# Patient Record
Sex: FEMALE | Race: WHITE | NOT HISPANIC OR LATINO | Employment: OTHER | URBAN - METROPOLITAN AREA
[De-identification: names, ages, dates, MRNs, and addresses within clinical notes are randomized per-mention and may not be internally consistent; named-entity substitution may affect disease eponyms.]

---

## 2018-02-28 LAB
BACTERIA UR CULT: NO GROWTH
EXTERNAL ANTIBODY SCREEN: NORMAL
EXTERNAL RUBELLA IGG QUANTITATION: NORMAL
HBV SURFACE AG SER QL: NEGATIVE
HCT VFR BLD AUTO: 40.1 % (ref 35–45)
HCV AB SER QL: NEGATIVE
HGB BLD-MCNC: 13.1 G/DL (ref 11.8–15.7)
HIV 1+2 AB+HIV1 P24 AG SERPL QL IA: NONREACTIVE
PLATELET # BLD AUTO: 291 K/UL (ref 150–369)
RPR SER QL: NEGATIVE

## 2018-04-25 ENCOUNTER — OFFICE VISIT (OUTPATIENT)
Dept: OBSTETRICS AND GYNECOLOGY | Facility: CLINIC | Age: 43
End: 2018-04-25
Payer: COMMERCIAL

## 2018-04-25 VITALS — WEIGHT: 126 LBS | SYSTOLIC BLOOD PRESSURE: 116 MMHG | DIASTOLIC BLOOD PRESSURE: 80 MMHG

## 2018-04-25 DIAGNOSIS — O09.521 ELDERLY MULTIGRAVIDA IN FIRST TRIMESTER: Primary | ICD-10-CM

## 2018-04-25 DIAGNOSIS — Z34.90 PREGNANCY, UNSPECIFIED GESTATIONAL AGE: ICD-10-CM

## 2018-04-25 LAB
BILIRUBIN, POC: NEGATIVE
BLOOD URINE, POC: NEGATIVE
CLARITY, POC: CLEAR
COLOR, POC: YELLOW
GLUCOSE URINE, POC: NEGATIVE
KETONES, POC: NEGATIVE
LEUKOCYTE EST, POC: NEGATIVE
NITRITE, POC: NEGATIVE
PROTEIN, POC: NEGATIVE

## 2018-04-25 PROCEDURE — 81002 URINALYSIS NONAUTO W/O SCOPE: CPT | Performed by: OBSTETRICS & GYNECOLOGY

## 2018-04-25 PROCEDURE — 0500F INITIAL PRENATAL CARE VISIT: CPT | Performed by: OBSTETRICS & GYNECOLOGY

## 2018-04-25 RX ORDER — DOXYLAMINE SUCCINATE AND PYRIDOXINE HYDROCHLORIDE, DELAYED RELEASE TABLETS 10 MG/10 MG 10; 10 MG/1; MG/1
1 TABLET, DELAYED RELEASE ORAL DAILY
Status: ON HOLD | COMMUNITY
End: 2018-10-09

## 2018-04-25 RX ORDER — PROGESTERONE 100 MG/1
100 CAPSULE ORAL DAILY
COMMUNITY
End: 2018-06-30 | Stop reason: ALTCHOICE

## 2018-04-25 RX ORDER — ESTRADIOL 1 MG/1
1 TABLET ORAL DAILY
COMMUNITY
End: 2018-06-30 | Stop reason: ALTCHOICE

## 2018-04-25 NOTE — PRENATAL NOTE
Age 41 yo  pmh none  psh none  Last exam >2 years  allg sulfa  Fertility- ivf with dr pierce, presently on hormonal replacement, had subchorionic hematoma with first trimester bleeding  Past ob hx- robert 2016 vacuum assistant vag del with 3rd degree tear, 7 lbs 8 oz, pushed 3 hours, advanced dilatation, 4 cm cervixl at 37 weeks, arrived at hospital 9 cm  Plan- consult with dr negron vaginal vs csection  Pelvic ultrasound- Cervical length 3.68, low lying placenta, crl 4.2 cm 11w 1d, resolved subchorionic hematoma   Problem list-  Advanced maternal age, prior 3rd degree tear, breast issues postpartum followed by dr stephenson

## 2018-05-11 ENCOUNTER — TELEPHONE (OUTPATIENT)
Dept: OBSTETRICS AND GYNECOLOGY | Facility: CLINIC | Age: 43
End: 2018-05-11

## 2018-05-11 RX ORDER — AZITHROMYCIN 250 MG/1
TABLET, FILM COATED ORAL
Qty: 6 TABLET | Refills: 0 | Status: SHIPPED | OUTPATIENT
Start: 2018-05-11 | End: 2018-05-16

## 2018-05-16 ENCOUNTER — OFFICE VISIT (OUTPATIENT)
Dept: OBSTETRICS AND GYNECOLOGY | Facility: CLINIC | Age: 43
End: 2018-05-16
Payer: COMMERCIAL

## 2018-05-16 VITALS — SYSTOLIC BLOOD PRESSURE: 114 MMHG | DIASTOLIC BLOOD PRESSURE: 80 MMHG | WEIGHT: 129 LBS

## 2018-05-16 DIAGNOSIS — Z34.90 PREGNANCY, UNSPECIFIED GESTATIONAL AGE: ICD-10-CM

## 2018-05-16 DIAGNOSIS — Z3A.13 13 WEEKS GESTATION OF PREGNANCY: Primary | ICD-10-CM

## 2018-05-16 LAB
BILIRUBIN, POC: NEGATIVE
BLOOD URINE, POC: NEGATIVE
CLARITY, POC: CLEAR
COLOR, POC: YELLOW
GLUCOSE URINE, POC: NEGATIVE
KETONES, POC: NEGATIVE
LEUKOCYTE EST, POC: ABNORMAL
NITRITE, POC: NEGATIVE
PROTEIN, POC: NEGATIVE

## 2018-05-16 PROCEDURE — 76801 OB US < 14 WKS SINGLE FETUS: CPT | Performed by: OBSTETRICS & GYNECOLOGY

## 2018-05-16 PROCEDURE — 0502F SUBSEQUENT PRENATAL CARE: CPT | Performed by: OBSTETRICS & GYNECOLOGY

## 2018-05-16 PROCEDURE — 81002 URINALYSIS NONAUTO W/O SCOPE: CPT | Performed by: OBSTETRICS & GYNECOLOGY

## 2018-05-16 RX ORDER — LORATADINE 10 MG/1
10 TABLET ORAL DAILY
Status: ON HOLD | COMMUNITY
End: 2018-10-09

## 2018-06-06 ENCOUNTER — OFFICE VISIT (OUTPATIENT)
Dept: OBSTETRICS AND GYNECOLOGY | Facility: CLINIC | Age: 43
End: 2018-06-06
Payer: COMMERCIAL

## 2018-06-06 VITALS — DIASTOLIC BLOOD PRESSURE: 78 MMHG | WEIGHT: 130 LBS | SYSTOLIC BLOOD PRESSURE: 112 MMHG

## 2018-06-06 DIAGNOSIS — O09.522 ELDERLY MULTIGRAVIDA IN SECOND TRIMESTER: ICD-10-CM

## 2018-06-06 DIAGNOSIS — Z52.89 SPERM DONOR: ICD-10-CM

## 2018-06-06 DIAGNOSIS — Z3A.16 16 WEEKS GESTATION OF PREGNANCY: Primary | ICD-10-CM

## 2018-06-06 PROCEDURE — 36415 COLL VENOUS BLD VENIPUNCTURE: CPT | Performed by: OBSTETRICS & GYNECOLOGY

## 2018-06-06 PROCEDURE — 0502F SUBSEQUENT PRENATAL CARE: CPT | Performed by: OBSTETRICS & GYNECOLOGY

## 2018-06-06 PROCEDURE — 76815 OB US LIMITED FETUS(S): CPT | Performed by: OBSTETRICS & GYNECOLOGY

## 2018-06-29 ENCOUNTER — OFFICE VISIT (OUTPATIENT)
Dept: OBSTETRICS AND GYNECOLOGY | Facility: CLINIC | Age: 43
End: 2018-06-29
Payer: COMMERCIAL

## 2018-06-29 ENCOUNTER — HOSPITAL ENCOUNTER (OUTPATIENT)
Dept: PERINATAL CARE | Facility: HOSPITAL | Age: 43
Discharge: HOME | End: 2018-06-29
Attending: OBSTETRICS & GYNECOLOGY
Payer: COMMERCIAL

## 2018-06-29 VITALS — BODY MASS INDEX: 22.66 KG/M2 | WEIGHT: 132 LBS | DIASTOLIC BLOOD PRESSURE: 78 MMHG | SYSTOLIC BLOOD PRESSURE: 114 MMHG

## 2018-06-29 VITALS — WEIGHT: 130 LBS | BODY MASS INDEX: 22.2 KG/M2 | HEIGHT: 64 IN

## 2018-06-29 DIAGNOSIS — Z52.89 SPERM DONOR: ICD-10-CM

## 2018-06-29 DIAGNOSIS — O09.522 ELDERLY MULTIGRAVIDA IN SECOND TRIMESTER: ICD-10-CM

## 2018-06-29 DIAGNOSIS — Z34.82 PRENATAL CARE, SUBSEQUENT PREGNANCY, SECOND TRIMESTER: Primary | ICD-10-CM

## 2018-06-29 DIAGNOSIS — O35.9XX0 SUSPECTED FETAL ABNORMALITY AFFECTING MANAGEMENT OF MOTHER, SINGLE OR UNSPECIFIED FETUS: ICD-10-CM

## 2018-06-29 DIAGNOSIS — Z3A.20 20 WEEKS GESTATION OF PREGNANCY: ICD-10-CM

## 2018-06-29 DIAGNOSIS — Z36.3 ANTENATAL SCREENING FOR MALFORMATION USING ULTRASONICS: ICD-10-CM

## 2018-06-29 DIAGNOSIS — O09.812 PREGNANCY RESULTING FROM ASSISTED REPRODUCTIVE TECHNOLOGY IN SECOND TRIMESTER: ICD-10-CM

## 2018-06-29 DIAGNOSIS — O44.02 PLACENTA PREVIA IN SECOND TRIMESTER: ICD-10-CM

## 2018-06-29 LAB
BILIRUBIN, POC: NEGATIVE
BLOOD URINE, POC: NEGATIVE
CLARITY, POC: CLEAR
COLOR, POC: YELLOW
GLUCOSE URINE, POC: NEGATIVE
KETONES, POC: NEGATIVE
LEUKOCYTE EST, POC: NORMAL
NITRITE, POC: NEGATIVE
PROTEIN, POC: NORMAL
SPECIFIC GRAVITY, POC: 7

## 2018-06-29 PROCEDURE — 76811 OB US DETAILED SNGL FETUS: CPT

## 2018-06-29 PROCEDURE — 0502F SUBSEQUENT PRENATAL CARE: CPT | Performed by: OBSTETRICS & GYNECOLOGY

## 2018-06-29 PROCEDURE — 81002 URINALYSIS NONAUTO W/O SCOPE: CPT | Performed by: OBSTETRICS & GYNECOLOGY

## 2018-06-29 NOTE — PRENATAL NOTE
Pt had anatomy scan and findings c/w placenta previa; she was given pelvic rest and bleeding precautions; follow up u/s at 26 weeks ordered

## 2018-07-06 ENCOUNTER — TELEPHONE (OUTPATIENT)
Dept: OBSTETRICS AND GYNECOLOGY | Facility: CLINIC | Age: 43
End: 2018-07-06

## 2018-07-09 LAB
AFP ADJ MOM SERPL: 0.97
AFP INTERP SERPL-IMP: NORMAL
AFP INTERP SERPL-IMP: NORMAL
AFP SERPL-MCNC: 39.3 NG/ML
AGE AT DELIVERY: 41.9 YR
GA METHOD: NORMAL
GA: 16.9 WEEKS
IDDM PATIENT QL: NO
LAB CORP COMMENT:: NORMAL
LAB CORP RESULTS: NORMAL
MULTIPLE PREGNANCY: NO
NEURAL TUBE DEFECT RISK FETUS: NORMAL %

## 2018-07-26 ENCOUNTER — OFFICE VISIT (OUTPATIENT)
Dept: OBSTETRICS AND GYNECOLOGY | Facility: CLINIC | Age: 43
End: 2018-07-26
Payer: COMMERCIAL

## 2018-07-26 VITALS — SYSTOLIC BLOOD PRESSURE: 102 MMHG | BODY MASS INDEX: 23.34 KG/M2 | WEIGHT: 136 LBS | DIASTOLIC BLOOD PRESSURE: 56 MMHG

## 2018-07-26 DIAGNOSIS — Z3A.24 24 WEEKS GESTATION OF PREGNANCY: Primary | ICD-10-CM

## 2018-07-26 LAB
BLOOD URINE, POC: NEGATIVE
CLARITY, POC: CLEAR
COLOR, POC: YELLOW
GLUCOSE URINE, POC: NEGATIVE
PROTEIN, POC: NEGATIVE

## 2018-07-26 PROCEDURE — 0502F SUBSEQUENT PRENATAL CARE: CPT | Performed by: OBSTETRICS & GYNECOLOGY

## 2018-07-26 PROCEDURE — 81002 URINALYSIS NONAUTO W/O SCOPE: CPT | Performed by: OBSTETRICS & GYNECOLOGY

## 2018-07-26 RX ORDER — PANTOPRAZOLE SODIUM 40 MG/1
40 TABLET, DELAYED RELEASE ORAL DAILY
Qty: 90 TABLET | Refills: 1 | Status: SHIPPED | OUTPATIENT
Start: 2018-07-26 | End: 2019-01-22

## 2018-07-30 ENCOUNTER — OFFICE VISIT (OUTPATIENT)
Dept: UROGYNECOLOGY | Facility: CLINIC | Age: 43
End: 2018-07-30
Payer: COMMERCIAL

## 2018-07-30 VITALS
HEIGHT: 64 IN | SYSTOLIC BLOOD PRESSURE: 112 MMHG | DIASTOLIC BLOOD PRESSURE: 64 MMHG | BODY MASS INDEX: 23.22 KG/M2 | WEIGHT: 136 LBS

## 2018-07-30 DIAGNOSIS — K62.81 ANAL SPHINCTER TEAR (HEALED) (NONTRAUMATIC) (OLD): Primary | ICD-10-CM

## 2018-07-30 PROCEDURE — 99244 OFF/OP CNSLTJ NEW/EST MOD 40: CPT | Performed by: OBSTETRICS & GYNECOLOGY

## 2018-07-30 RX ORDER — LORAZEPAM 1 MG/1
TABLET ORAL
Refills: 1 | Status: ON HOLD | COMMUNITY
Start: 2018-07-26 | End: 2018-10-09

## 2018-07-30 ASSESSMENT — ENCOUNTER SYMPTOMS
PSYCHIATRIC NEGATIVE: 1
NOCTURNAL DYSPNEA: 0
NEUROLOGICAL NEGATIVE: 1
MUSCULOSKELETAL NEGATIVE: 1
RESPIRATORY NEGATIVE: 1
FREQUENCY: 0
ALLERGIC/IMMUNOLOGIC NEGATIVE: 1
HEMATOLOGIC/LYMPHATIC NEGATIVE: 1
ENDOCRINE NEGATIVE: 1

## 2018-07-30 NOTE — ASSESSMENT & PLAN NOTE
Prior third degree laceration, with modifiable risks (first delivery, episiotomy and vacuum)  On exam, sphincter is intact with good muscle tone  Discussed a 3 -7% recurrence rate  Discussed overall risk of significant complication with  delivery (10%)  It would be acceptable to consider vaginal delivery with the following parameters:  Avoid second stage of > 60 mintues  Avoid any instrumental delivery or episiotomy  Warm compresses to perineum throughout labor  Will consider anal ultrasound in near future    I spent 50 minutes with the patient and her spouse discussing route of future delivery, risks of recurrent tear and risks of anal incontinence, with or without a vaginal delivery.

## 2018-07-30 NOTE — PROGRESS NOTES
"Visit Date: 7/30/2018   Jennifer Amador is 42 y.o. female who is here for a re-evaluation of Consult      ***    The following have been reviewed and updated as appropriate in this visit:      /64   Ht 1.626 m (5' 4\")   Wt 61.7 kg (136 lb)   LMP 02/06/2018 (Exact Date)   BMI 23.34 kg/m²     Review of Systems  OBGyn Exam    PLAN:  No problem-specific Assessment & Plan notes found for this encounter.      ***  No Follow-up on file.      Hany Sevilla MD        "

## 2018-07-30 NOTE — LETTER
"July 30, 2018     Olu Fountain MD  [address], [address]  [facility] [address]    Patient: Jennifer Amador   YOB: 1975   Date of Visit: 7/30/2018       Dear Dr. Fountain:    Thank you for referring Jennifer Amador to me for evaluation. Below are my notes for this consultation.    If you have questions, please do not hesitate to call me. I look forward to following your patient along with you.         Sincerely,        Hany Sevilla MD        CC: No Recipients  Hany Sevilla MD  7/30/2018 11:35 AM  Sign at close encounter  Visit Date: 7/30/2018   Jennifer Amador is 42 y.o. female who is referred for evaluation by Dr Olu Cifuentes for prenatal consult for route of delivery for second pregnancy.    Her first delivery was 2016 and was complicated third degree laceration.  Infant was 7.5 pounds after a 3.5 hour second stage. No  Epidural. Had an episiotomy and vacuum.    Healed well,  Has had no bowel  control issues.  Currently 24 weeks, with placenta previa.      /64   Ht 1.626 m (5' 4\")   Wt 61.7 kg (136 lb)   LMP 02/06/2018 (Exact Date)   BMI 23.34 kg/m²      Medications:   Current Outpatient Prescriptions:   •  LORazepam (ATIVAN) 1 mg tablet, TAKE 1 TABLET BY MOUTH EVERY 6 TO 8 HOURS AS NEEDED, Disp: , Rfl: 1  •  pantoprazole (PROTONIX) 40 mg EC tablet, Take 1 tablet (40 mg total) by mouth daily., Disp: 90 tablet, Rfl: 1  •  prenat.vits,yefri,min-iron-folic (PRENATAL VITAMIN) tablet, take 1 tablet by oral route  every day, Disp: , Rfl:   •  doxylamine-pyridoxine, vit B6, (DICLEGIS) 10-10 mg tablet,delayed release (DR/EC), Take 1 tablet by mouth daily., Disp: , Rfl:   •  loratadine (CLARITIN) 10 mg tablet, Take 10 mg by mouth daily., Disp: , Rfl:     Allergies: is allergic to sulfa (sulfonamide antibiotics).     Past Medical History:  has no past medical history on file.  Past Surgical History:  has a past surgical history that includes Myringotomy (1983); myringotomy tube removal " (1986); and Myringotomy.  Family History: family history includes Breast cancer in her maternal grandmother; Cancer in her father.  Social History:  reports that she has never smoked. She has never used smokeless tobacco. She reports that she does not drink alcohol or use drugs.      Review of Systems   Respiratory: Negative.    Cardiovascular: Positive for leg swelling. Negative for nocturnal dyspnea.   Gastrointestinal:        No fecal urgency, incontinence or soiling   Endocrine: Negative.    Genitourinary: Negative for dyspareunia, frequency, nocturia, pain with intercourse, vaginal bleeding and vaginal discharge.   Musculoskeletal: Negative.    Allergic/Immunologic: Negative.    Neurological: Negative.    Hematological: Negative.    Psychiatric/Behavioral: Negative.      Physical Exam   Constitutional: She is oriented to person, place, and time. She appears well-developed and well-nourished.   Genitourinary: Pelvic exam was performed with patient in lithotomy exam position.     External female genitalia normal.   Urethral meatus normal.   Urethra normal. There is no urethral urethrocele or urethral diverticulum. No stress urinary incontinence.  Normal bladder. The vagina is not narrow. No signs of injury around the vagina. Perineum intact.   Cervix exam normal.Uterus is enlarged (gravid, 24 weeks size).   Right adnexum non-palpable.   Left adnexum non-palpable.   Rectal exam shows normal sphincter tone (appears intact anteriorly good resting tone), no rectal prolapse, no external hemorrhoid and no internal hemorrhoid.   Genitourinary Comments: Vagina and perineum is well healed  Normal anal verge  Normal levator function bilateral   Strength 4/5 bilateral   Neck: No JVD present. No thyromegaly present.   Cardiovascular: Normal pulses.    No  JVD  No peripheral edema  Peripheral vascular normal   Pulmonary/Chest: Effort normal. No tachypnea. No respiratory distress.   Abdominal: Normal appearance. She exhibits  no distension and no ascites. There is no hepatosplenomegaly. There is no tenderness. There is no CVA tenderness. No hernia.   Lymphadenopathy:        Right: No inguinal adenopathy present.        Left: No inguinal adenopathy present.   Neurological: She is alert and oriented to person, place, and time. No sensory deficit.   Skin: No bruising and no rash noted.   Psychiatric: Her behavior is normal. Her affect is not inappropriate. Cognition and memory are normal. She is attentive.       Assessment/Plan     Anal sphincter tear (healed) (nontraumatic) (old)  Prior third degree laceration, with modifiable risks (first delivery, episiotomy and vacuum)  On exam, sphincter is intact with good muscle tone  Discussed a 3 -6% recurrence rate  It would be acceptable to consider vaginal delivery with the following parameters:  Avoid second stage of > 60 mintues  Avoid any instrumental delivery or episiotomy  Warm compresses to perineum throughout labor  Will consider anal ultrasound in near future      Return will call to schedule anal ultrasound.     Hany Sevilla MD

## 2018-07-30 NOTE — PROGRESS NOTES
New Pt referred by Dr. Olu Fountain.  Pt here to see if she needs to have a  since her first birth she had a 3rd degree tear.

## 2018-07-30 NOTE — PROGRESS NOTES
"Visit Date: 7/30/2018   Jennifer Amador is 42 y.o. female who is referred for evaluation by Dr Olu Cifuentes for prenatal consult for route of delivery for second pregnancy.    Her first delivery was 2016 and was complicated third degree laceration.  Infant was 7.5 pounds after a 3.5 hour second stage. No  Epidural. Had an episiotomy and vacuum.    Healed well,  Has had no bowel  control issues.  Currently 24 weeks, with placenta previa.      /64   Ht 1.626 m (5' 4\")   Wt 61.7 kg (136 lb)   LMP 02/06/2018 (Exact Date)   BMI 23.34 kg/m²     Medications:   Current Outpatient Prescriptions:   •  LORazepam (ATIVAN) 1 mg tablet, TAKE 1 TABLET BY MOUTH EVERY 6 TO 8 HOURS AS NEEDED, Disp: , Rfl: 1  •  pantoprazole (PROTONIX) 40 mg EC tablet, Take 1 tablet (40 mg total) by mouth daily., Disp: 90 tablet, Rfl: 1  •  prenat.vits,yefri,min-iron-folic (PRENATAL VITAMIN) tablet, take 1 tablet by oral route  every day, Disp: , Rfl:   •  doxylamine-pyridoxine, vit B6, (DICLEGIS) 10-10 mg tablet,delayed release (DR/EC), Take 1 tablet by mouth daily., Disp: , Rfl:   •  loratadine (CLARITIN) 10 mg tablet, Take 10 mg by mouth daily., Disp: , Rfl:     Allergies: is allergic to sulfa (sulfonamide antibiotics).     Past Medical History:  has no past medical history on file.  Past Surgical History:  has a past surgical history that includes Myringotomy (1983); myringotomy tube removal (1986); and Myringotomy.  Family History: family history includes Breast cancer in her maternal grandmother; Cancer in her father.  Social History:  reports that she has never smoked. She has never used smokeless tobacco. She reports that she does not drink alcohol or use drugs.      Review of Systems   Respiratory: Negative.    Cardiovascular: Positive for leg swelling. Negative for nocturnal dyspnea.   Gastrointestinal:        No fecal urgency, incontinence or soiling   Endocrine: Negative.    Genitourinary: Negative for dyspareunia, frequency, nocturia, " pain with intercourse, vaginal bleeding and vaginal discharge.   Musculoskeletal: Negative.    Allergic/Immunologic: Negative.    Neurological: Negative.    Hematological: Negative.    Psychiatric/Behavioral: Negative.      Physical Exam   Constitutional: She is oriented to person, place, and time. She appears well-developed and well-nourished.   Genitourinary: Pelvic exam was performed with patient in lithotomy exam position.     External female genitalia normal.   Urethral meatus normal.   Urethra normal. There is no urethral urethrocele or urethral diverticulum. No stress urinary incontinence.  Normal bladder. The vagina is not narrow. No signs of injury around the vagina. Perineum intact.   Cervix exam normal.Uterus is enlarged (gravid, 24 weeks size).   Right adnexum non-palpable.   Left adnexum non-palpable.   Rectal exam shows normal sphincter tone (appears intact anteriorly good resting tone), no rectal prolapse, no external hemorrhoid and no internal hemorrhoid.   Genitourinary Comments: Vagina and perineum is well healed  Normal anal verge  Normal levator function bilateral   Strength 4/5 bilateral   Neck: No JVD present. No thyromegaly present.   Cardiovascular: Normal pulses.    No  JVD  No peripheral edema  Peripheral vascular normal   Pulmonary/Chest: Effort normal. No tachypnea. No respiratory distress.   Abdominal: Normal appearance. She exhibits no distension and no ascites. There is no hepatosplenomegaly. There is no tenderness. There is no CVA tenderness. No hernia.   Lymphadenopathy:        Right: No inguinal adenopathy present.        Left: No inguinal adenopathy present.   Neurological: She is alert and oriented to person, place, and time. No sensory deficit.   Skin: No bruising and no rash noted.   Psychiatric: Her behavior is normal. Her affect is not inappropriate. Cognition and memory are normal. She is attentive.       Assessment/Plan     Anal sphincter tear (healed) (nontraumatic)  (old)  Prior third degree laceration, with modifiable risks (first delivery, episiotomy and vacuum)  On exam, sphincter is intact with good muscle tone  Discussed a 3 -6% recurrence rate  It would be acceptable to consider vaginal delivery with the following parameters:  Avoid second stage of > 60 mintues  Avoid any instrumental delivery or episiotomy  Warm compresses to perineum throughout labor  Will consider anal ultrasound in near future      Return will call to schedule anal ultrasound.     Hany Sevilla MD

## 2018-07-30 NOTE — LETTER
"July 30, 2018     Olu Fountain MD  [address], [address]  [facility] [address]    Patient: Jennifer Amador   YOB: 1975   Date of Visit: 7/30/2018       Dear Dr. Fountain:    Thank you for referring Jennifer Amador to me for evaluation. Below are my notes for this consultation.    If you have questions, please do not hesitate to call me. I look forward to following your patient along with you.         Sincerely,        Hany Sevilla MD        CC: No Recipients  Hany Sevilla MD  7/30/2018 11:35 AM  Sign at close encounter  Visit Date: 7/30/2018   Jennifer Amador is 42 y.o. female who is referred for evaluation by Dr Olu Cifuentes for prenatal consult for route of delivery for second pregnancy.    Her first delivery was 2016 and was complicated third degree laceration.  Infant was 7.5 pounds after a 3.5 hour second stage. No  Epidural. Had an episiotomy and vacuum.    Healed well,  Has had no bowel  control issues.  Currently 24 weeks, with placenta previa.      /64   Ht 1.626 m (5' 4\")   Wt 61.7 kg (136 lb)   LMP 02/06/2018 (Exact Date)   BMI 23.34 kg/m²      Medications:   Current Outpatient Prescriptions:   •  LORazepam (ATIVAN) 1 mg tablet, TAKE 1 TABLET BY MOUTH EVERY 6 TO 8 HOURS AS NEEDED, Disp: , Rfl: 1  •  pantoprazole (PROTONIX) 40 mg EC tablet, Take 1 tablet (40 mg total) by mouth daily., Disp: 90 tablet, Rfl: 1  •  prenat.vits,yefri,min-iron-folic (PRENATAL VITAMIN) tablet, take 1 tablet by oral route  every day, Disp: , Rfl:   •  doxylamine-pyridoxine, vit B6, (DICLEGIS) 10-10 mg tablet,delayed release (DR/EC), Take 1 tablet by mouth daily., Disp: , Rfl:   •  loratadine (CLARITIN) 10 mg tablet, Take 10 mg by mouth daily., Disp: , Rfl:     Allergies: is allergic to sulfa (sulfonamide antibiotics).     Past Medical History:  has no past medical history on file.  Past Surgical History:  has a past surgical history that includes Myringotomy (1983); myringotomy tube removal " (1986); and Myringotomy.  Family History: family history includes Breast cancer in her maternal grandmother; Cancer in her father.  Social History:  reports that she has never smoked. She has never used smokeless tobacco. She reports that she does not drink alcohol or use drugs.      Review of Systems   Respiratory: Negative.    Cardiovascular: Positive for leg swelling. Negative for nocturnal dyspnea.   Gastrointestinal:        No fecal urgency, incontinence or soiling   Endocrine: Negative.    Genitourinary: Negative for dyspareunia, frequency, nocturia, pain with intercourse, vaginal bleeding and vaginal discharge.   Musculoskeletal: Negative.    Allergic/Immunologic: Negative.    Neurological: Negative.    Hematological: Negative.    Psychiatric/Behavioral: Negative.      Physical Exam   Constitutional: She is oriented to person, place, and time. She appears well-developed and well-nourished.   Genitourinary: Pelvic exam was performed with patient in lithotomy exam position.     External female genitalia normal.   Urethral meatus normal.   Urethra normal. There is no urethral urethrocele or urethral diverticulum. No stress urinary incontinence.  Normal bladder. The vagina is not narrow. No signs of injury around the vagina. Perineum intact.   Cervix exam normal.Uterus is enlarged (gravid, 24 weeks size).   Right adnexum non-palpable.   Left adnexum non-palpable.   Rectal exam shows normal sphincter tone (appears intact anteriorly good resting tone), no rectal prolapse, no external hemorrhoid and no internal hemorrhoid.   Genitourinary Comments: Vagina and perineum is well healed  Normal anal verge  Normal levator function bilateral   Strength 4/5 bilateral   Neck: No JVD present. No thyromegaly present.   Cardiovascular: Normal pulses.    No  JVD  No peripheral edema  Peripheral vascular normal   Pulmonary/Chest: Effort normal. No tachypnea. No respiratory distress.   Abdominal: Normal appearance. She exhibits  "no distension and no ascites. There is no hepatosplenomegaly. There is no tenderness. There is no CVA tenderness. No hernia.   Lymphadenopathy:        Right: No inguinal adenopathy present.        Left: No inguinal adenopathy present.   Neurological: She is alert and oriented to person, place, and time. No sensory deficit.   Skin: No bruising and no rash noted.   Psychiatric: Her behavior is normal. Her affect is not inappropriate. Cognition and memory are normal. She is attentive.       Assessment/Plan     Anal sphincter tear (healed) (nontraumatic) (old)  Prior third degree laceration, with modifiable risks (first delivery, episiotomy and vacuum)  On exam, sphincter is intact with good muscle tone  Discussed a 3 -6% recurrence rate  It would be acceptable to consider vaginal delivery with the following parameters:  Avoid second stage of > 60 mintues  Avoid any instrumental delivery or episiotomy  Warm compresses to perineum throughout labor  Will consider anal ultrasound in near future      Return will call to schedule anal ultrasound.     MD Hany Prieto MD  7/30/2018 10:43 AM  Sign at close encounter  Visit Date: 7/30/2018   Jennifer Amador is 42 y.o. female who is here for a re-evaluation of Consult      ***    The following have been reviewed and updated as appropriate in this visit:      /64   Ht 1.626 m (5' 4\")   Wt 61.7 kg (136 lb)   LMP 02/06/2018 (Exact Date)   BMI 23.34 kg/m²      Review of Systems  OBGyn Exam    PLAN:  No problem-specific Assessment & Plan notes found for this encounter.      ***  No Follow-up on file.      Hany Sevilla MD                    "

## 2018-08-14 ENCOUNTER — TELEPHONE (OUTPATIENT)
Dept: OBSTETRICS AND GYNECOLOGY | Facility: CLINIC | Age: 43
End: 2018-08-14

## 2018-08-14 ENCOUNTER — HOSPITAL ENCOUNTER (OUTPATIENT)
Dept: PERINATAL CARE | Facility: HOSPITAL | Age: 43
Discharge: HOME | End: 2018-08-14
Attending: OBSTETRICS & GYNECOLOGY
Payer: COMMERCIAL

## 2018-08-14 DIAGNOSIS — Z3A.26 26 WEEKS GESTATION OF PREGNANCY: ICD-10-CM

## 2018-08-14 DIAGNOSIS — O44.02 PLACENTA PREVIA IN SECOND TRIMESTER: Primary | ICD-10-CM

## 2018-08-14 DIAGNOSIS — O09.522 AMA (ADVANCED MATERNAL AGE) MULTIGRAVIDA 35+, SECOND TRIMESTER: ICD-10-CM

## 2018-08-14 DIAGNOSIS — Z03.74 SUSPECTED PROBLEM WITH FETAL GROWTH NOT FOUND: ICD-10-CM

## 2018-08-14 PROCEDURE — 76816 OB US FOLLOW-UP PER FETUS: CPT

## 2018-08-14 NOTE — TELEPHONE ENCOUNTER
PATIENT WAS JUST UPSTAIRS @ THE [facility] AND THEY CONFIRMED PLACENTA PREVIA. SHE WOULD LIKE DR LANE TO PLEASE CALL HER, SHE HAS SOME QUESTIONS REGARDING VACATION ETC...

## 2018-08-14 NOTE — TELEPHONE ENCOUNTER
Patient has diagnosed placenta previa at 28 weeks we reviewed the management limitations on activities we will plan for  section at 37 weeks so far no bleeding

## 2018-08-20 ENCOUNTER — OFFICE VISIT (OUTPATIENT)
Dept: OBSTETRICS AND GYNECOLOGY | Facility: CLINIC | Age: 43
End: 2018-08-20
Payer: COMMERCIAL

## 2018-08-20 VITALS — WEIGHT: 139 LBS | BODY MASS INDEX: 23.86 KG/M2 | DIASTOLIC BLOOD PRESSURE: 68 MMHG | SYSTOLIC BLOOD PRESSURE: 104 MMHG

## 2018-08-20 DIAGNOSIS — Z3A.27 27 WEEKS GESTATION OF PREGNANCY: Primary | ICD-10-CM

## 2018-08-20 DIAGNOSIS — O09.523 AMA (ADVANCED MATERNAL AGE) MULTIGRAVIDA 35+, THIRD TRIMESTER: ICD-10-CM

## 2018-08-20 PROBLEM — O44.02 PLACENTA PREVIA SPECIFIED AS WITHOUT HEMORRHAGE IN SECOND TRIMESTER: Status: ACTIVE | Noted: 2018-08-20

## 2018-08-20 PROCEDURE — 81002 URINALYSIS NONAUTO W/O SCOPE: CPT | Performed by: OBSTETRICS & GYNECOLOGY

## 2018-08-20 PROCEDURE — 0502F SUBSEQUENT PRENATAL CARE: CPT | Performed by: OBSTETRICS & GYNECOLOGY

## 2018-08-20 RX ORDER — DIPHENHYDRAMINE HCL 50 MG
50 CAPSULE ORAL EVERY 6 HOURS PRN
COMMUNITY
End: 2018-10-27 | Stop reason: HOSPADM

## 2018-08-20 NOTE — TELEPHONE ENCOUNTER
PATIENT STATED SHE HAS PLACENTA PREVIA AND SAID THAT SHE SCHEDULED HER 20 WEEK DETAILED ANATOMY @ THE [facility]. SHE SAID THAT SHE HAS QUESTIONS ABOUT HER NON STRESS TEST. THANKS.

## 2018-08-22 ENCOUNTER — TELEPHONE (OUTPATIENT)
Dept: OBSTETRICS AND GYNECOLOGY | Facility: CLINIC | Age: 43
End: 2018-08-22

## 2018-08-22 LAB
BASOPHILS # BLD AUTO: 0 X10E3/UL (ref 0–0.2)
BASOPHILS NFR BLD AUTO: 0 %
EOSINOPHIL # BLD AUTO: 0.1 X10E3/UL (ref 0–0.4)
EOSINOPHIL NFR BLD AUTO: 1 %
ERYTHROCYTE [DISTWIDTH] IN BLOOD BY AUTOMATED COUNT: 13.8 % (ref 12.3–15.4)
GLUCOSE 1H P 50 G GLC PO SERPL-MCNC: 124 MG/DL (ref 65–139)
HCT VFR BLD AUTO: 32.1 % (ref 34–46.6)
HGB BLD-MCNC: 10.6 G/DL (ref 11.1–15.9)
IMM GRANULOCYTES # BLD: 0 X10E3/UL (ref 0–0.1)
IMM GRANULOCYTES NFR BLD: 0 %
LYMPHOCYTES # BLD AUTO: 1.3 X10E3/UL (ref 0.7–3.1)
LYMPHOCYTES NFR BLD AUTO: 19 %
MCH RBC QN AUTO: 28.3 PG (ref 26.6–33)
MCHC RBC AUTO-ENTMCNC: 33 G/DL (ref 31.5–35.7)
MCV RBC AUTO: 86 FL (ref 79–97)
MONOCYTES # BLD AUTO: 0.3 X10E3/UL (ref 0.1–0.9)
MONOCYTES NFR BLD AUTO: 5 %
NEUTROPHILS # BLD AUTO: 5 X10E3/UL (ref 1.4–7)
NEUTROPHILS NFR BLD AUTO: 75 %
PLATELET # BLD AUTO: 292 X10E3/UL (ref 150–379)
RBC # BLD AUTO: 3.75 X10E6/UL (ref 3.77–5.28)
WBC # BLD AUTO: 6.7 X10E3/UL (ref 3.4–10.8)

## 2018-09-17 ENCOUNTER — TELEPHONE (OUTPATIENT)
Dept: OBSTETRICS AND GYNECOLOGY | Facility: CLINIC | Age: 43
End: 2018-09-17

## 2018-09-18 ENCOUNTER — HOSPITAL ENCOUNTER (OUTPATIENT)
Dept: PERINATAL CARE | Facility: HOSPITAL | Age: 43
Discharge: HOME | End: 2018-09-18
Attending: OBSTETRICS & GYNECOLOGY
Payer: COMMERCIAL

## 2018-09-18 DIAGNOSIS — O44.02 PLACENTA PREVIA IN SECOND TRIMESTER: ICD-10-CM

## 2018-09-18 DIAGNOSIS — Z3A.31 31 WEEKS GESTATION OF PREGNANCY: ICD-10-CM

## 2018-09-18 DIAGNOSIS — O09.523 ADVANCED MATERNAL AGE IN MULTIGRAVIDA, THIRD TRIMESTER: ICD-10-CM

## 2018-09-18 DIAGNOSIS — O44.03 ANTEPARTUM PLACENTA PREVIA WITHOUT HEMORRHAGE IN THIRD TRIMESTER: ICD-10-CM

## 2018-09-18 DIAGNOSIS — O44.02 PLACENTA PREVIA SPECIFIED AS WITHOUT HEMORRHAGE IN SECOND TRIMESTER: Primary | ICD-10-CM

## 2018-09-18 PROCEDURE — 76816 OB US FOLLOW-UP PER FETUS: CPT

## 2018-09-19 ENCOUNTER — OFFICE VISIT (OUTPATIENT)
Dept: OBSTETRICS AND GYNECOLOGY | Facility: CLINIC | Age: 43
End: 2018-09-19
Payer: COMMERCIAL

## 2018-09-19 ENCOUNTER — HOSPITAL ENCOUNTER (OUTPATIENT)
Dept: PERINATAL CARE | Facility: HOSPITAL | Age: 43
Discharge: HOME | End: 2018-09-19
Attending: OBSTETRICS & GYNECOLOGY
Payer: COMMERCIAL

## 2018-09-19 VITALS — WEIGHT: 141 LBS | SYSTOLIC BLOOD PRESSURE: 110 MMHG | DIASTOLIC BLOOD PRESSURE: 60 MMHG | BODY MASS INDEX: 24.2 KG/M2

## 2018-09-19 VITALS — DIASTOLIC BLOOD PRESSURE: 67 MMHG | SYSTOLIC BLOOD PRESSURE: 118 MMHG

## 2018-09-19 DIAGNOSIS — O09.523 ELDERLY MULTIGRAVIDA IN THIRD TRIMESTER: ICD-10-CM

## 2018-09-19 DIAGNOSIS — Z23 NEED FOR DIPHTHERIA-TETANUS-PERTUSSIS (TDAP) VACCINE: ICD-10-CM

## 2018-09-19 DIAGNOSIS — Z3A.31 31 WEEKS GESTATION OF PREGNANCY: ICD-10-CM

## 2018-09-19 DIAGNOSIS — O44.03 PLACENTA PREVIA ANTEPARTUM IN THIRD TRIMESTER: ICD-10-CM

## 2018-09-19 DIAGNOSIS — Z3A.31 31 WEEKS GESTATION OF PREGNANCY: Primary | ICD-10-CM

## 2018-09-19 DIAGNOSIS — O09.523 AMA (ADVANCED MATERNAL AGE) MULTIGRAVIDA 35+, THIRD TRIMESTER: ICD-10-CM

## 2018-09-19 DIAGNOSIS — Z23 NEEDS FLU SHOT: ICD-10-CM

## 2018-09-19 DIAGNOSIS — Z34.82 PRENATAL CARE, SUBSEQUENT PREGNANCY, SECOND TRIMESTER: ICD-10-CM

## 2018-09-19 LAB
BLOOD URINE, POC: NEGATIVE
GLUCOSE URINE, POC: NEGATIVE
LEUKOCYTE EST, POC: NEGATIVE
NITRITE, POC: NEGATIVE
PROTEIN, POC: NEGATIVE

## 2018-09-19 PROCEDURE — 90472 IMMUNIZATION ADMIN EACH ADD: CPT | Performed by: OBSTETRICS & GYNECOLOGY

## 2018-09-19 PROCEDURE — 0502F SUBSEQUENT PRENATAL CARE: CPT | Performed by: OBSTETRICS & GYNECOLOGY

## 2018-09-19 PROCEDURE — 90715 TDAP VACCINE 7 YRS/> IM: CPT | Performed by: OBSTETRICS & GYNECOLOGY

## 2018-09-19 PROCEDURE — 90686 IIV4 VACC NO PRSV 0.5 ML IM: CPT | Performed by: OBSTETRICS & GYNECOLOGY

## 2018-09-19 PROCEDURE — 90471 IMMUNIZATION ADMIN: CPT | Performed by: OBSTETRICS & GYNECOLOGY

## 2018-09-19 PROCEDURE — 59025 FETAL NON-STRESS TEST: CPT

## 2018-09-19 PROCEDURE — 81002 URINALYSIS NONAUTO W/O SCOPE: CPT | Performed by: OBSTETRICS & GYNECOLOGY

## 2018-09-19 RX ORDER — DIPHENHYDRAMINE HCL 50 MG
50 CAPSULE ORAL EVERY 6 HOURS PRN
Status: ON HOLD | COMMUNITY
End: 2018-10-09 | Stop reason: SDUPTHER

## 2018-09-19 RX ORDER — MAGNESIUM CHLORIDE 64 MG
TABLET, DELAYED RELEASE (ENTERIC COATED) ORAL DAILY
COMMUNITY
End: 2018-10-27 | Stop reason: HOSPADM

## 2018-09-26 ENCOUNTER — HOSPITAL ENCOUNTER (OUTPATIENT)
Dept: PERINATAL CARE | Facility: HOSPITAL | Age: 43
Discharge: HOME | End: 2018-09-26
Attending: OBSTETRICS & GYNECOLOGY
Payer: COMMERCIAL

## 2018-09-26 VITALS — DIASTOLIC BLOOD PRESSURE: 69 MMHG | SYSTOLIC BLOOD PRESSURE: 116 MMHG

## 2018-09-26 DIAGNOSIS — Z3A.32 32 WEEKS GESTATION OF PREGNANCY: ICD-10-CM

## 2018-09-26 DIAGNOSIS — O09.523 ELDERLY MULTIGRAVIDA IN THIRD TRIMESTER: Primary | ICD-10-CM

## 2018-09-26 DIAGNOSIS — O44.03 PLACENTA PREVIA ANTEPARTUM IN THIRD TRIMESTER: ICD-10-CM

## 2018-09-26 PROCEDURE — 59025 FETAL NON-STRESS TEST: CPT

## 2018-10-03 ENCOUNTER — HOSPITAL ENCOUNTER (OUTPATIENT)
Dept: PERINATAL CARE | Facility: HOSPITAL | Age: 43
Discharge: HOME | End: 2018-10-03
Attending: OBSTETRICS & GYNECOLOGY
Payer: COMMERCIAL

## 2018-10-03 VITALS — SYSTOLIC BLOOD PRESSURE: 113 MMHG | DIASTOLIC BLOOD PRESSURE: 66 MMHG

## 2018-10-03 DIAGNOSIS — Z3A.33 33 WEEKS GESTATION OF PREGNANCY: Primary | ICD-10-CM

## 2018-10-03 PROCEDURE — 76815 OB US LIMITED FETUS(S): CPT

## 2018-10-04 ENCOUNTER — OFFICE VISIT (OUTPATIENT)
Dept: OBSTETRICS AND GYNECOLOGY | Facility: CLINIC | Age: 43
End: 2018-10-04
Payer: COMMERCIAL

## 2018-10-04 VITALS — BODY MASS INDEX: 24.72 KG/M2 | SYSTOLIC BLOOD PRESSURE: 120 MMHG | WEIGHT: 144 LBS | DIASTOLIC BLOOD PRESSURE: 64 MMHG

## 2018-10-04 DIAGNOSIS — Z3A.34 34 WEEKS GESTATION OF PREGNANCY: Primary | ICD-10-CM

## 2018-10-04 DIAGNOSIS — Z34.83 PRENATAL CARE, SUBSEQUENT PREGNANCY, THIRD TRIMESTER: ICD-10-CM

## 2018-10-04 PROCEDURE — 81002 URINALYSIS NONAUTO W/O SCOPE: CPT | Performed by: OBSTETRICS & GYNECOLOGY

## 2018-10-04 PROCEDURE — 0502F SUBSEQUENT PRENATAL CARE: CPT | Performed by: OBSTETRICS & GYNECOLOGY

## 2018-10-09 ENCOUNTER — DOCUMENTATION (OUTPATIENT)
Dept: PERINATAL CARE | Facility: HOSPITAL | Age: 43
End: 2018-10-09

## 2018-10-09 ENCOUNTER — HOSPITAL ENCOUNTER (OUTPATIENT)
Facility: HOSPITAL | Age: 43
Setting detail: OBSERVATION
LOS: 1 days | Discharge: HOME | End: 2018-10-10
Attending: OBSTETRICS & GYNECOLOGY | Admitting: OBSTETRICS & GYNECOLOGY
Payer: COMMERCIAL

## 2018-10-09 DIAGNOSIS — O44.02 PLACENTA PREVIA SPECIFIED AS WITHOUT HEMORRHAGE IN SECOND TRIMESTER: Primary | ICD-10-CM

## 2018-10-09 PROBLEM — Z34.90 PREGNANCY: Status: ACTIVE | Noted: 2018-10-09

## 2018-10-09 LAB
ABO + RH BLD: NORMAL
APTT PPP: 25 SEC (ref 23–35)
BLD GP AB SCN SERPL QL: NEGATIVE
D AG BLD QL: POSITIVE
ERYTHROCYTE [DISTWIDTH] IN BLOOD BY AUTOMATED COUNT: 14.3 % (ref 11.7–14.4)
FIBRINOGEN PPP-MCNC: 390 MG/DL (ref 220–480)
HCT VFR BLDCO AUTO: 32.1 % (ref 35–45)
HGB BLD-MCNC: 10.6 G/DL (ref 11.8–15.7)
HGB F MFR BLD KLEIH BETKE: <0.1 %
INR PPP: 1 INR
LABORATORY COMMENT REPORT: NORMAL
MCH RBC QN AUTO: 28.1 PG (ref 28–33.2)
MCHC RBC AUTO-ENTMCNC: 33 G/DL (ref 32.2–35.5)
MCV RBC AUTO: 85.1 FL (ref 83–98)
PDW BLD AUTO: 10 FL (ref 9.4–12.3)
PLATELET # BLD AUTO: 253 K/UL (ref 150–369)
PROTHROMBIN TIME: 13.4 SEC (ref 12.2–14.5)
RBC # BLD AUTO: 3.77 M/UL (ref 3.93–5.22)
WBC # BLD AUTO: 6.16 K/UL (ref 3.8–10.5)

## 2018-10-09 PROCEDURE — 63700000 HC SELF-ADMINISTRABLE DRUG: Performed by: NURSE PRACTITIONER

## 2018-10-09 PROCEDURE — 85027 COMPLETE CBC AUTOMATED: CPT | Performed by: STUDENT IN AN ORGANIZED HEALTH CARE EDUCATION/TRAINING PROGRAM

## 2018-10-09 PROCEDURE — 63600000 HC DRUGS/DETAIL CODE: Performed by: STUDENT IN AN ORGANIZED HEALTH CARE EDUCATION/TRAINING PROGRAM

## 2018-10-09 PROCEDURE — 85730 THROMBOPLASTIN TIME PARTIAL: CPT | Performed by: STUDENT IN AN ORGANIZED HEALTH CARE EDUCATION/TRAINING PROGRAM

## 2018-10-09 PROCEDURE — 36415 COLL VENOUS BLD VENIPUNCTURE: CPT | Performed by: STUDENT IN AN ORGANIZED HEALTH CARE EDUCATION/TRAINING PROGRAM

## 2018-10-09 PROCEDURE — 86900 BLOOD TYPING SEROLOGIC ABO: CPT

## 2018-10-09 PROCEDURE — 86920 COMPATIBILITY TEST SPIN: CPT

## 2018-10-09 PROCEDURE — 85384 FIBRINOGEN ACTIVITY: CPT | Performed by: STUDENT IN AN ORGANIZED HEALTH CARE EDUCATION/TRAINING PROGRAM

## 2018-10-09 PROCEDURE — 85610 PROTHROMBIN TIME: CPT | Performed by: STUDENT IN AN ORGANIZED HEALTH CARE EDUCATION/TRAINING PROGRAM

## 2018-10-09 PROCEDURE — 63700000 HC SELF-ADMINISTRABLE DRUG

## 2018-10-09 PROCEDURE — 12000000 HC ROOM AND CARE MED/SURG

## 2018-10-09 PROCEDURE — 85460 HEMOGLOBIN FETAL: CPT | Performed by: STUDENT IN AN ORGANIZED HEALTH CARE EDUCATION/TRAINING PROGRAM

## 2018-10-09 PROCEDURE — G0378 HOSPITAL OBSERVATION PER HR: HCPCS

## 2018-10-09 RX ORDER — PANTOPRAZOLE SODIUM 40 MG/1
40 TABLET, DELAYED RELEASE ORAL
Status: DISCONTINUED | OUTPATIENT
Start: 2018-10-09 | End: 2018-10-10 | Stop reason: HOSPADM

## 2018-10-09 RX ORDER — BETAMETHASONE SODIUM PHOSPHATE AND BETAMETHASONE ACETATE 3; 3 MG/ML; MG/ML
12 INJECTION, SUSPENSION INTRA-ARTICULAR; INTRALESIONAL; INTRAMUSCULAR; SOFT TISSUE
Status: DISCONTINUED | OUTPATIENT
Start: 2018-10-09 | End: 2018-10-09

## 2018-10-09 RX ORDER — CALCIUM CARBONATE 200(500)MG
1000 TABLET,CHEWABLE ORAL EVERY 4 HOURS PRN
Status: DISCONTINUED | OUTPATIENT
Start: 2018-10-09 | End: 2018-10-09

## 2018-10-09 RX ORDER — CALCIUM CARBONATE 200(500)MG
1000 TABLET,CHEWABLE ORAL 3 TIMES DAILY PRN
Status: DISCONTINUED | OUTPATIENT
Start: 2018-10-09 | End: 2018-10-10 | Stop reason: HOSPADM

## 2018-10-09 RX ORDER — BETAMETHASONE SODIUM PHOSPHATE AND BETAMETHASONE ACETATE 3; 3 MG/ML; MG/ML
12 INJECTION, SUSPENSION INTRA-ARTICULAR; INTRALESIONAL; INTRAMUSCULAR; SOFT TISSUE ONCE
Status: COMPLETED | OUTPATIENT
Start: 2018-10-10 | End: 2018-10-10

## 2018-10-09 RX ORDER — SODIUM CHLORIDE 9 MG/ML
5 INJECTION, SOLUTION INTRAVENOUS AS NEEDED
Status: DISCONTINUED | OUTPATIENT
Start: 2018-10-09 | End: 2018-10-09

## 2018-10-09 RX ORDER — SODIUM CHLORIDE, SODIUM LACTATE, POTASSIUM CHLORIDE, CALCIUM CHLORIDE 600; 310; 30; 20 MG/100ML; MG/100ML; MG/100ML; MG/100ML
125 INJECTION, SOLUTION INTRAVENOUS CONTINUOUS
Status: DISCONTINUED | OUTPATIENT
Start: 2018-10-09 | End: 2018-10-10

## 2018-10-09 RX ORDER — ALUMINUM HYDROXIDE, MAGNESIUM HYDROXIDE, AND SIMETHICONE 1200; 120; 1200 MG/30ML; MG/30ML; MG/30ML
30 SUSPENSION ORAL EVERY 4 HOURS PRN
Status: DISCONTINUED | OUTPATIENT
Start: 2018-10-09 | End: 2018-10-09

## 2018-10-09 RX ORDER — ALUMINUM HYDROXIDE, MAGNESIUM HYDROXIDE, AND SIMETHICONE 1200; 120; 1200 MG/30ML; MG/30ML; MG/30ML
15 SUSPENSION ORAL EVERY 4 HOURS PRN
Status: DISCONTINUED | OUTPATIENT
Start: 2018-10-09 | End: 2018-10-10 | Stop reason: HOSPADM

## 2018-10-09 RX ADMIN — PRENATAL VIT W/ FE FUMARATE-FA TAB 27-0.8 MG 1 TABLET: 27-0.8 TAB at 13:09

## 2018-10-09 RX ADMIN — SODIUM CHLORIDE, POTASSIUM CHLORIDE, SODIUM LACTATE AND CALCIUM CHLORIDE 1000 ML: 600; 310; 30; 20 INJECTION, SOLUTION INTRAVENOUS at 07:40

## 2018-10-09 RX ADMIN — BETAMETHASONE SODIUM PHOSPHATE AND BETAMETHASONE ACETATE 12 MG: 3; 3 INJECTION, SUSPENSION INTRA-ARTICULAR; INTRALESIONAL; INTRAMUSCULAR at 07:50

## 2018-10-09 RX ADMIN — SODIUM CHLORIDE, POTASSIUM CHLORIDE, SODIUM LACTATE AND CALCIUM CHLORIDE 125 ML/HR: 600; 310; 30; 20 INJECTION, SOLUTION INTRAVENOUS at 08:28

## 2018-10-09 RX ADMIN — ANTACID TABLETS 1000 MG: 500 TABLET, CHEWABLE ORAL at 18:05

## 2018-10-09 NOTE — H&P
"MARY JANE Amador is a 42 y.o. female  at 34w5d with an estimated due date of 11/15/2018, by Ultrasound who presents with vaginal bleeding in the setting of posterior placenta previa. She sensed passing \"discharge\" at around 5:30am. She went to the bathroom and noticed bright red blood on her underwear, soaking though her underwear \"like period blood\", and dripping into her toilet for about 5-10min. She put a pad on it and came to the hospital. There was a small amount of brown blood on her pad, no bright red blood. Around the time she noticed vaginal bleeding, she started to feel crampy and soon started to experience contractions every 10-15min. She denies LOF and reports good FM. She denies any other complications in this pregnancy including gHTN/GDM.    Last PO intake:  Last Food Intake Date: 10/08/18, Last Food Intake Time: 1800   ,      OB History:   Obstetric History       T1      L1     SAB2   TAB0   Ectopic0   Multiple0   Live Births1       # Outcome Date GA Lbr Angel/2nd Weight Sex Delivery Anes PTL Lv   4 Current            3 SAB            2 SAB            1 Term    3.402 kg  Vag-Vacuum           GYN History: Denies fibroids, cysts. History of abnl pap 5 years ago s/p colposcopy and subsequent normal pap.    Medical History: Denies asthma, HTN.    Surgical History:   Past Surgical History:   Procedure Laterality Date   • MYRINGOTOMY     • MYRINGOTOMY TUBE REMOVAL       Social History: Denies EtOH, cigarettes and recreational drug usage during this pregnancy.    Family History:   Family History   Problem Relation Age of Onset   • Cancer Father    • Breast cancer Maternal Grandmother        Allergies: Sulfa (sulfonamide antibiotics) - hives    Prior to Admission medications    Medication Sig Start Date End Date Taking? Authorizing Provider   diphenhydrAMINE (BENADRYL) 50 mg capsule Take 50 mg by mouth every 6 (six) hours as needed for itching.    Provider, MD Geoffrey "   diphenhydramine HCl (UNISOM, DIPHENHYDRAMINE, ORAL) Take by mouth.    Geoffrey Almodovar MD   FERRALET 90 DUAL-IRON DELIVERY 90-1-12-50 mg-mg-mcg-mg tablet Take 1 tablet by mouth daily. 18   Olu Fountain MD   magnesium chloride 64 mg tablet,delayed release (DR/EC) Take by mouth daily.    Geoffrey Almodovar MD   pantoprazole (PROTONIX) 40 mg EC tablet Take 1 tablet (40 mg total) by mouth daily. 18  Olu Fountain MD   prenat.vits,yefri,min-iron-folic (PRENATAL VITAMIN) tablet take 1 tablet by oral route  every day    Geoffrey Almodovar MD   diphenhydrAMINE (BENADRYL) 50 mg capsule Take 50 mg by mouth every 6 (six) hours as needed for itching.  10/9/18  Geoffrey Almodovar MD   doxylamine-pyridoxine, vit B6, (DICLEGIS) 10-10 mg tablet,delayed release (DR/EC) Take 1 tablet by mouth daily.  10/9/18  Geoffrey Almodovar MD   loratadine (CLARITIN) 10 mg tablet Take 10 mg by mouth daily.  10/9/18  Geoffrey Almodovar MD   LORazepam (ATIVAN) 1 mg tablet TAKE 1 TABLET BY MOUTH EVERY 6 TO 8 HOURS AS NEEDED 7/26/18 10/9/18  Geoffrey Almodovar MD       Review of Systems  Pertinent items are noted in HPI.    Objective     Vital Signs for the last 24 hours:    134/73    Fetal Monitoring:  FHR Baseline: 130  FHR Variability: Moderate  FHR Accelerations: 15x15 >2  FHR Decelerations: absent    Contraction Frequency: q9-11min    Physical Exam:  Gen: AAOx3, NAD  Abdomen: Gravid, nontender  Extremities: No edema bilaterally or calf tenderness  Neurologic: Grossly intact without focal deficits  Sterile Speculum Exam: Dark red blood clot size of a quarter noted in the posterior vaginal vault. Cervix was easily visualized. External os was visually closed. No active bleeding noted in the external os.     Assessment/Plan     Jennifer Amador is a 42 y.o. female  at 34w5d admitted for vaginal bleeding with placenta previa.    1. FHR: Reactive  2. GBS: unknown  3. Vaginal bleeding     -No bleeding  noted in the external os. Bleeding has ceased and stable at this time. Pt is scheduled for  on 10/25.   -Will get CBC, coag, type & cross for 2 units. Will give BMTZ x2 for fetal lung maturity. IVF hydration. NPO.    All questions answered to best of my abilities.    Plan d/w Dr. Fountain.    Anamaria Dunn MD

## 2018-10-09 NOTE — PROGRESS NOTES
R3    Patient well, comfortable. Feeling contractions occasionally, but they are mild, and this is not a change for her. No further active bleeding. Has dark red streaks with wiping.    / mod/ +accels/ no decels  [place] quiet    WBC 6.2 Hgb 10.6 Plt 253  PT 13.4 PTT 25 INR 1.0  O+ - Cross matched x2u    Monitoring reassuring. Placed MFM consult per Dr. Fountain's request. Patient stable, will move to APU room. Plan to observe until at least 2nd dose of BMTZ tomorrow. Continue to monitor for additional hour, then FHT can be monitoring intermittently. NPO for now. If remains stable throughout morning, can change to regular diet for lunch.    D/w Dr. Fountain.    Liz Morales,

## 2018-10-09 NOTE — PROGRESS NOTES
BT is a 41 yo G 2 P 1 at 34+ weeks, with a know placenta previa admitted with a 1 hour episode of red vaginal bleeding earlier today.  BT is receiving rescue steroids.      Current Pregnancy:  Uncomplicated prior to today.      P Ob Hx 2016 - SAVD after an uncomplicated pregnancy - Noemy.    Recommendations:    Patients with placenta previas should be delivered at 36 - 37 weeks or sooner if bleeding becomes possibly harmful to mother or baby.      If the bleeding does not resume, discharge to reduced activities at home with strategies to be able to return emergently to the hospital is an appropriate management plan.     If there is a second episode of red bleeding, hospitalization until delivery should be considered.    If any further bleeding occurs that raises risk of harm to mother or baby - deliver should occur at that time.    Carlos Lou  [phone]

## 2018-10-09 NOTE — PROGRESS NOTES
Obstetrics Antepartum Progress Note    Subjective     Interval History: none.     Patient reports: normal fetal movement, no contractions, no leakage of fluid, and scant brown-tinged discharge    Objective     Vital Signs for the last 24 hours:        Fetal Monitoring:  FHR Baseline: baseline change from 120 to 115  FHR Variability: moderate  FHR Accelerations: present  FHR Decelerations: absent    Contraction Frequency: irregular ctx    Vaginal Bleeding: pink (10/09/18 0930)      Current Facility-Administered Medications:   •  alum-mag hydroxide-simeth (MAALOX) 200-200-20 mg/5 mL suspension 15 mL, 15 mL, oral, q4h PRN, Anel Dave CRNP  •  [START ON 10/10/2018] betamethasone acet,sod phos (CELESTONE) injection 12 mg, 12 mg, intramuscular, Once, Anel Dave CRNP  •  calcium carbonate (TUMS) chewable tablet 1,000 mg, 1,000 mg, oral, 3x daily PRN, Anel Dave CRNP  •  [COMPLETED] lactated ringer's bolus 1,000 mL, 1,000 mL, intravenous, Once, Last Rate: 4,000 mL/hr at 10/09/18 0740, 1,000 mL at 10/09/18 0740 **FOLLOWED BY** lactated ringer's infusion, 125 mL/hr, intravenous, Continuous, Anamaria Dunn MD, Last Rate: 125 mL/hr at 10/09/18 0828, 125 mL/hr at 10/09/18 0828    Assessment/Plan     Jennifer Amador is a 42 y.o. female  at 34w5d admitted with observation 2/2 vaginal bleeding, placenta previa    1) FHR: Reactive  2) GBS: unknown  3) vaginal bleeding/placenta previa: pt with scant brown-tinged discharge currently; coags wnl; pt s/p BMTZ #1 today at 0750, for 2nd dose tomorrow at 0750; MFM consult pending  4) EFM monitoring for 1hr q8hrs and prn  5) regular diet  6) Plan discussed with IRVING Turner  10/09/18, 12:35 PM

## 2018-10-10 VITALS
DIASTOLIC BLOOD PRESSURE: 70 MMHG | WEIGHT: 144 LBS | OXYGEN SATURATION: 96 % | RESPIRATION RATE: 18 BRPM | HEIGHT: 64 IN | HEART RATE: 83 BPM | SYSTOLIC BLOOD PRESSURE: 114 MMHG | BODY MASS INDEX: 24.59 KG/M2 | TEMPERATURE: 98 F

## 2018-10-10 PROCEDURE — 99225 PR SBSQ OBSERVATION CARE/DAY 25 MINUTES: CPT | Performed by: OBSTETRICS & GYNECOLOGY

## 2018-10-10 PROCEDURE — 63700000 HC SELF-ADMINISTRABLE DRUG: Performed by: NURSE PRACTITIONER

## 2018-10-10 PROCEDURE — 63700000 HC SELF-ADMINISTRABLE DRUG: Performed by: STUDENT IN AN ORGANIZED HEALTH CARE EDUCATION/TRAINING PROGRAM

## 2018-10-10 PROCEDURE — G0378 HOSPITAL OBSERVATION PER HR: HCPCS

## 2018-10-10 PROCEDURE — 63600000 HC DRUGS/DETAIL CODE: Performed by: STUDENT IN AN ORGANIZED HEALTH CARE EDUCATION/TRAINING PROGRAM

## 2018-10-10 PROCEDURE — 63600000 HC DRUGS/DETAIL CODE: Performed by: NURSE PRACTITIONER

## 2018-10-10 RX ORDER — ACETAMINOPHEN 325 MG/1
975 TABLET ORAL ONCE
Status: COMPLETED | OUTPATIENT
Start: 2018-10-10 | End: 2018-10-10

## 2018-10-10 RX ORDER — ACETAMINOPHEN 325 MG/1
TABLET ORAL
Status: DISCONTINUED
Start: 2018-10-10 | End: 2018-10-10 | Stop reason: HOSPADM

## 2018-10-10 RX ADMIN — ACETAMINOPHEN 975 MG: 325 TABLET ORAL at 07:12

## 2018-10-10 RX ADMIN — BETAMETHASONE SODIUM PHOSPHATE AND BETAMETHASONE ACETATE 12 MG: 3; 3 INJECTION, SUSPENSION INTRA-ARTICULAR; INTRALESIONAL; INTRAMUSCULAR at 07:52

## 2018-10-10 RX ADMIN — PANTOPRAZOLE SODIUM 40 MG: 40 TABLET, DELAYED RELEASE ORAL at 07:24

## 2018-10-10 RX ADMIN — SODIUM CHLORIDE, POTASSIUM CHLORIDE, SODIUM LACTATE AND CALCIUM CHLORIDE 125 ML/HR: 600; 310; 30; 20 INJECTION, SOLUTION INTRAVENOUS at 00:28

## 2018-10-10 NOTE — DISCHARGE SUMMARY
Inpatient Discharge Summary    BRIEF OVERVIEW  Admitting Provider: Olu Fountain MD  Discharge Provider: Olu Fountain MD  Primary Care Physician at Discharge: Hilary Gallagher  [phone]    Admission Date: 10/9/2018     Discharge Date: 10/10/2018    Primary Discharge Diagnosis  Placenta previa specified as without hemorrhage in second trimester      Discharge Disposition  Stable     Discharge Medications     Medication List      CONTINUE taking these medications    * diphenhydrAMINE 50 mg capsule  Commonly known as:  BENADRYL  Take 50 mg by mouth every 6 (six) hours as needed for itching.     * UNISOM (DIPHENHYDRAMINE) ORAL  Take by mouth.     FERRALET 90 DUAL-IRON DELIVERY 90-1-12-50 mg-mg-mcg-mg tablet  Generic drug:  iron,carbon,gluc-FA-B12-C-dss  Take 1 tablet by mouth daily.     magnesium chloride 64 mg tablet,delayed release (DR/EC)  Take by mouth daily.     pantoprazole 40 mg EC tablet  Commonly known as:  PROTONIX  Take 1 tablet (40 mg total) by mouth daily.     PRENATAL VITAMIN tablet  Generic drug:  prenat.vits,yefri,min-iron-folic  take 1 tablet by oral route  every day        * This list has 2 medication(s) that are the same as other medications prescribed for you. Read the directions carefully, and ask your doctor or other care provider to review them with you.                Outpatient Follow-Up            Today [facility]  Testing Center    Today Olu Fountain MD [facility]    In 6 days [facility]  [place]  Testing Center    In 1 week [facility]  Testing Center    In 1 week Lis Terrazas MD [facility]    In 2 weeks [address]  Testing Center            DETAILS OF HOSPITAL STAY    Presenting Problem/History of Present Illness  Placenta previa specified as without hemorrhage in second trimester  [O44.02]  Pregnancy [Z34.90]    Pt presented to L&D on 10/9 with vaginal bleeding in setting of known placenta previa. The episode was self limited and soaked one pad. Pt has not had any further episodes of bleeding and will be discharged home with precautions to return should she have sx of PTL or a recurrence of bleeding. She will be delivered by CS at 37wks unless further episode of bleeding, then delivery as indicated.

## 2018-10-10 NOTE — DISCHARGE INSTRUCTIONS
Placenta Previa  Placenta previa is a condition in which the placenta implants in the lower part of the uterus in pregnant women. The placenta either partially or completely covers the opening to the cervix. This is a problem because the baby must pass through the cervix during delivery. There are three types of placenta previa:  · Marginal placenta previa. The placenta reaches within an inch (2.5 cm) of the cervical opening but does not cover it.  · Partial placenta previa. The placenta covers part of the cervical opening.  · Complete placenta previa. The placenta covers the entire cervical opening.  If the previa is marginal or partial and it is diagnosed in the first half of pregnancy, the placenta may move into a normal position as the pregnancy progresses and may no longer cover the cervix. It is important to keep all prenatal visits with your health care provider so you can be more closely monitored.  What are the causes?  The cause of this condition is not known.  What increases the risk?  This condition is more likely to develop in women who:  · Are carrying more than one baby (multiples).  · Have an abnormally shaped uterus.  · Have scars on the lining of the uterus.  · Have had surgeries involving the uterus, such as a  delivery.  · Have delivered a baby before.  · Have a history of placenta previa.  · Have smoked or used cocaine during pregnancy.  · Are age 35 or older during pregnancy.  What are the signs or symptoms?  The main symptom of this condition is sudden, painless vaginal bleeding during the second half of pregnancy. The amount of bleeding can be very light at first, and it usually stops on its own. Heavier bleeding episodes may also happen. Some women with placenta previa may have no bleeding at all.  How is this diagnosed?  · This condition is diagnosed:  ¨ From an ultrasound. This test uses sound waves to find where the placenta is located before you have any bleeding  episodes.  ¨ During a checkup after vaginal bleeding is noticed.  · If you are diagnosed with a partial or complete previa, digital exams with fingers will generally be avoided. Your health care provider will still perform a speculum exam.  · If you did not have an ultrasound during your pregnancy, placenta previa may not be diagnosed until bleeding occurs during labor.  How is this treated?  Treatment for this condition may include:  · Decreased activity.  · Bed rest at home or in the hospital.  · Pelvic rest. Nothing is placed inside the vagina during pelvic rest. This means not having sex and not using tampons or douches.  · A blood transfusion to replace blood that you have lost (maternal blood loss).  · A  delivery. This may be performed if:  ¨ The bleeding is heavy and cannot be controlled.  ¨ The placenta completely covers the cervix.  · Medicines to stop premature labor or to help the baby's lungs to mature. This treatment may be used if you need delivery before your pregnancy is full-term.  Your treatment will be decided based on:  · How much you are bleeding, or whether the bleeding has stopped.  · How far along you are in your pregnancy.  · The condition of your baby.  · The type of placenta previa that you have.  Follow these instructions at home:  · Get plenty of rest and lessen activity as told by your health care provider.  · Stay on bed rest for as long as told by your health care provider.  · Do not have sex, use tampons, use a douche, or place anything inside of your vagina if your health care provider recommended pelvic rest.  · Take over-the-counter and prescription medicines as told by your health care provider.  · Keep all follow-up visits as told by your health care provider. This is important.  Get help right away if:  · You have vaginal bleeding, even if in small amounts and even if you have no pain.  · You have cramping or regular contractions.  · You have pain in your abdomen or  your lower back.  · You have a feeling of increased pressure in your pelvis.  · You have increased watery or bloody mucus from the vagina.  This information is not intended to replace advice given to you by your health care provider. Make sure you discuss any questions you have with your health care provider.  Document Released: 12/18/2006 Document Revised: 09/06/2017 Document Reviewed: 07/01/2017  [employer] Interactive Patient Education © 2018 [employer]  Fetal Movement Counts  Patient Name: ________________________________________________ Patient Due Date: ____________________  What is a fetal movement count?  A fetal movement count is the number of times that you feel your baby move during a certain amount of time. This may also be called a fetal kick count. A fetal movement count is recommended for every pregnant woman. You may be asked to start counting fetal movements as early as week 28 of your pregnancy.  Pay attention to when your baby is most active. You may notice your baby's sleep and wake cycles. You may also notice things that make your baby move more. You should do a fetal movement count:  · When your baby is normally most active.  · At the same time each day.  A good time to count movements is while you are resting, after having something to eat and drink.  How do I count fetal movements?  1. Find a quiet, comfortable area. Sit, or lie down on your side.  2. Write down the date, the start time and stop time, and the number of movements that you felt between those two times. Take this information with you to your health care visits.  3. For 2 hours, count kicks, flutters, swishes, rolls, and jabs. You should feel at least 10 movements during 2 hours.  4. You may stop counting after you have felt 10 movements.  5. If you do not feel 10 movements in 2 hours, have something to eat and drink. Then, keep resting and counting for 1 hour. If you feel at least 4 movements during that hour, you may stop  counting.  Contact a health care provider if:  · You feel fewer than 4 movements in 2 hours.  · Your baby is not moving like he or she usually does.  Date: ____________ Start time: ____________ Stop time: ____________ Movements: ____________  Date: ____________ Start time: ____________ Stop time: ____________ Movements: ____________  Date: ____________ Start time: ____________ Stop time: ____________ Movements: ____________  Date: ____________ Start time: ____________ Stop time: ____________ Movements: ____________  Date: ____________ Start time: ____________ Stop time: ____________ Movements: ____________  Date: ____________ Start time: ____________ Stop time: ____________ Movements: ____________  Date: ____________ Start time: ____________ Stop time: ____________ Movements: ____________  Date: ____________ Start time: ____________ Stop time: ____________ Movements: ____________  Date: ____________ Start time: ____________ Stop time: ____________ Movements: ____________  This information is not intended to replace advice given to you by your health care provider. Make sure you discuss any questions you have with your health care provider.  Document Released: 01/17/2008 Document Revised: 08/16/2017 Document Reviewed: 01/26/2017  [employer] Interactive Patient Education © 2018 [employer]

## 2018-10-10 NOTE — PROGRESS NOTES
Antepartum Progress Note    Subjective     Pt seen/examined. Without complaints. No vaginal bleeding, no contractions.     Objective     Vital Signs for the last 24 hours:  Temp:  [36.6 °C (97.9 °F)-36.8 °C (98.3 °F)] 36.8 °C (98.2 °F)  Heart Rate:  [75-96] 90  Resp:  [18] 18  BP: (117-134)/(58-73) 117/59     Fetal Monitoring:  FHR Baseline: 105  FHR Variability: moderate  FHR Accelerations: present  FHR Decelerations: absent     Contraction Frequency: none on TOCO    General Appearance: Alert, cooperative, no acute distress    Assessment/Plan     Jennifer Amador is a 42 y.o. female  at 34w6d admitted withobservation 2/2 vaginal bleeding, placenta previa, with low baseline on FHT.      FHR: Low baseline however highly reassuring with excellent variability and accelerations   Repositioned and will give IV fluid bolus. Will continue on continuous monitoring and reevaluate following full one hour of monitoring     Chely Bernal MD

## 2018-10-10 NOTE — NURSING NOTE
Discharge instructions given to patient. Signs and symptoms of a bleeding previa reviewed extensively with patient. Patient instructed that in the case of a serious bleed or emergency they should head to the nearest hospital. Patient and significant other state understanding.

## 2018-10-10 NOTE — PROGRESS NOTES
Ms. Amador was admitted on Tuesday with vaginal bleeding had a known placenta previa she is a  4 para 1 admitted at 34 6/7 days now 35 weeks she has been maintained overnight received 2 doses of steroids she has had no further bleeding some brown staining to down by maternal-fetal medicine it has been decided that she would be fine to go home she understands if she has another red bleeding episode she will be readmitted to the hospital if controlled should be maintained in the hospital until 36 weeks at which point she will undergo a  if she remains symptom-free her plan C-sections for 37-week this couple is fully instructed will be back to my office next Tuesday

## 2018-10-10 NOTE — PROGRESS NOTES
Labor and Delivery Progress Note    Subjective     Interval History: pt well this morning. No complaints. Denies further episode of vaginal bleeding. Denies cramping, contractions, abdominal pain. Denies LOF. She continues to appreciate good fetal movement.     Objective     Vital Signs for the last 24 hours:  Temp:  [36.6 °C (97.9 °F)-36.8 °C (98.3 °F)] 36.6 °C (97.9 °F)  Heart Rate:  [75-96] 83  Resp:  [18] 18  BP: (115-134)/(55-73) 115/55     Fetal Monitorin/mod geoff/+accels/no decels    TOCO: quiet     Assessment/Plan     Jennifer Amador is a 42 y.o. female  at 34w6d admitted for observation 2/2 vaginal bleeding in setting of placenta previa     1. FHR: Reactive - had episode of low baseline ~90bpm, however reassuring with moderate variability and accelerations   2. GBS: unknown   3. Vaginal bleeding - pt has not had another episode of vaginal bleeding, no s/sx progression to PTL. Will receive second dose of BMTZ this morning and likely stable for discharge to home with precautions to return should she have any further bleeding or s/sx progression to labor. S/p MFM consult    Alix Estrada DO

## 2018-10-13 LAB
CROSSMATCH: NORMAL
CROSSMATCH: NORMAL
ISBT CODE: 5100
ISBT CODE: 5100
PRODUCT CODE: NORMAL
PRODUCT CODE: NORMAL
PRODUCT STATUS: NORMAL
PRODUCT STATUS: NORMAL
SPECIMEN EXP DATE BLD: NORMAL
SPECIMEN EXP DATE BLD: NORMAL
UNIT ABO: NORMAL
UNIT ABO: NORMAL
UNIT ID: NORMAL
UNIT ID: NORMAL
UNIT RH: POSITIVE
UNIT RH: POSITIVE

## 2018-10-15 ENCOUNTER — TELEPHONE (OUTPATIENT)
Dept: OBSTETRICS AND GYNECOLOGY | Facility: CLINIC | Age: 43
End: 2018-10-15

## 2018-10-15 NOTE — TELEPHONE ENCOUNTER
PT IS 35 WEEKS, STATED SHE IS NOT BLEEDING BUT MAY HAVE A YEAST INFECTION. HAS AN APPT TOMORROW, OFFERED HER AN APPT TODAY BUT STATED SHE IS ON BED REST AND HAS ALL HER APPTS TOMORROW.  DR LANE DIDN'T WANT HER MOVING AROUND TOO MUCH.  IF HE WANTED TO SEE HER TODAY ADELINA STATED THEY COULD COME IN.

## 2018-10-16 ENCOUNTER — HOSPITAL ENCOUNTER (OUTPATIENT)
Dept: PERINATAL CARE | Facility: HOSPITAL | Age: 43
Discharge: HOME | End: 2018-10-16
Attending: OBSTETRICS & GYNECOLOGY
Payer: COMMERCIAL

## 2018-10-16 ENCOUNTER — OFFICE VISIT (OUTPATIENT)
Dept: OBSTETRICS AND GYNECOLOGY | Facility: CLINIC | Age: 43
End: 2018-10-16
Payer: COMMERCIAL

## 2018-10-16 VITALS — DIASTOLIC BLOOD PRESSURE: 70 MMHG | SYSTOLIC BLOOD PRESSURE: 124 MMHG | WEIGHT: 140 LBS | BODY MASS INDEX: 24.03 KG/M2

## 2018-10-16 VITALS — SYSTOLIC BLOOD PRESSURE: 124 MMHG | DIASTOLIC BLOOD PRESSURE: 80 MMHG

## 2018-10-16 DIAGNOSIS — Z34.83 PRENATAL CARE, SUBSEQUENT PREGNANCY, THIRD TRIMESTER: ICD-10-CM

## 2018-10-16 DIAGNOSIS — O09.523 ADVANCED MATERNAL AGE IN MULTIGRAVIDA, THIRD TRIMESTER: ICD-10-CM

## 2018-10-16 DIAGNOSIS — Z3A.36 36 WEEKS GESTATION OF PREGNANCY: ICD-10-CM

## 2018-10-16 DIAGNOSIS — O44.02 PLACENTA PREVIA SPECIFIED AS WITHOUT HEMORRHAGE IN SECOND TRIMESTER: ICD-10-CM

## 2018-10-16 DIAGNOSIS — O44.03 ANTEPARTUM PLACENTA PREVIA WITHOUT HEMORRHAGE IN THIRD TRIMESTER: ICD-10-CM

## 2018-10-16 DIAGNOSIS — Z3A.35 35 WEEKS GESTATION OF PREGNANCY: ICD-10-CM

## 2018-10-16 DIAGNOSIS — O44.03 PLACENTA PREVIA ANTEPARTUM, THIRD TRIMESTER: ICD-10-CM

## 2018-10-16 DIAGNOSIS — Z3A.35 35 WEEKS GESTATION OF PREGNANCY: Primary | ICD-10-CM

## 2018-10-16 PROCEDURE — 81002 URINALYSIS NONAUTO W/O SCOPE: CPT | Performed by: OBSTETRICS & GYNECOLOGY

## 2018-10-16 PROCEDURE — 76816 OB US FOLLOW-UP PER FETUS: CPT

## 2018-10-16 PROCEDURE — 0502F SUBSEQUENT PRENATAL CARE: CPT | Performed by: OBSTETRICS & GYNECOLOGY

## 2018-10-16 PROCEDURE — 59025 FETAL NON-STRESS TEST: CPT

## 2018-10-18 ENCOUNTER — HOSPITAL ENCOUNTER (OUTPATIENT)
Facility: HOSPITAL | Age: 43
Discharge: HOME | End: 2018-10-18
Attending: OBSTETRICS & GYNECOLOGY | Admitting: OBSTETRICS & GYNECOLOGY
Payer: COMMERCIAL

## 2018-10-18 VITALS
DIASTOLIC BLOOD PRESSURE: 84 MMHG | RESPIRATION RATE: 20 BRPM | HEART RATE: 98 BPM | SYSTOLIC BLOOD PRESSURE: 138 MMHG | WEIGHT: 140 LBS | HEIGHT: 64 IN | BODY MASS INDEX: 23.9 KG/M2 | TEMPERATURE: 97.7 F

## 2018-10-18 DIAGNOSIS — O46.90 VAGINAL BLEEDING DURING PREGNANCY: Primary | ICD-10-CM

## 2018-10-18 LAB — GP B STREP DNA SPEC QL NAA+PROBE: NEGATIVE

## 2018-10-18 PROCEDURE — 99217 PR OBSERVATION CARE DISCHARGE MANAGEMENT: CPT | Performed by: OBSTETRICS & GYNECOLOGY

## 2018-10-18 RX ORDER — LORAZEPAM 1 MG/1
1 TABLET ORAL EVERY 6 HOURS PRN
COMMUNITY

## 2018-10-18 NOTE — DISCHARGE INSTRUCTIONS
Pelvic Rest  Pelvic rest may be recommended if:  · Your placenta is partially or completely covering the opening of your cervix (placenta previa).  · There is bleeding between the wall of the uterus and the amniotic sac in the first trimester of pregnancy (subchorionic hemorrhage).  · You went into labor too early ( labor).  Based on your overall health and the health of your baby, your health care provider will decide if pelvic rest is right for you.  How do I rest my pelvis?  For as long as told by your health care provider:  · Do not have sex, sexual stimulation, or an orgasm.  · Do not use tampons. Do not douche. Do not put anything in your vagina.  · Do not lift anything that is heavier than 10 lb (4.5 kg).  · Avoid activities that take a lot of effort (are strenuous).  · Avoid any activity in which your pelvic muscles could become strained.  When should I seek medical care?  Seek medical care if you have:  · Cramping pain in your lower abdomen.  · Vaginal discharge.  · A low, dull backache.  · Regular contractions.  · Uterine tightening.  When should I seek immediate medical care?  Seek immediate medical care if:  · You have vaginal bleeding and you are pregnant.  This information is not intended to replace advice given to you by your health care provider. Make sure you discuss any questions you have with your health care provider.  Document Released: 2012 Document Revised: 2017 Document Reviewed: 2016  [employer] Interactive Patient Education © 2018 [employer]

## 2018-10-18 NOTE — TELEPHONE ENCOUNTER
Patient's wife just called and said she was told to call if anything happens.Patient has placenta previa.  Her wife Jennifer has placenta previa and has a bloody show. A tiny bit of brown discharge. They are aware Dr. Fountain is away. Thanks.

## 2018-10-18 NOTE — H&P
"  MARY JANE Amador is a 42 y.o. female  at 36w0d with an estimated due date of 11/15/2018, by first trimester Ultrasound who presents with c/o \"blood-tinged mucous discharge\" x1 episode today at 1117.  Pt reports +fm, no LOF, and irregular ctx.  Current pregnancy complicated by posterior placenta previa.  Pt was previous admission for vaginal bleeding on 10/9/18 and received BMTZ x2 doses on 10/9/18 and 10/10/18.    Last PO intake:  Last Food Intake Date: 10/18/18, Last Food Intake Time: 0930  Last Fluid Intake Date: 10/18/18, Last Fluid Intake Time: 1130    OB History:   Obstetric History       T1      L1     SAB2   TAB0   Ectopic0   Multiple0   Live Births1       # Outcome Date GA Lbr Angel/2nd Weight Sex Delivery Anes PTL Lv   4 Current            3 SAB            2 SAB            1 Term    3.402 kg  Vag-Vacuum             Medical History: History reviewed. No pertinent past medical history.    Surgical History:   Past Surgical History:   Procedure Laterality Date   • MYRINGOTOMY     • MYRINGOTOMY TUBE REMOVAL         Social History:   Social History     Social History   • Marital status:      Spouse name: N/A   • Number of children: N/A   • Years of education: N/A     Social History Main Topics   • Smoking status: Former Smoker     Quit date:    • Smokeless tobacco: Never Used   • Alcohol use No   • Drug use: No   • Sexual activity: Yes     Partners: Female     Birth control/ protection: None     Other Topics Concern   • None     Social History Narrative   • None        Family History:   Family History   Problem Relation Age of Onset   • Cancer Father    • Breast cancer Maternal Grandmother        Allergies: Sulfa (sulfonamide antibiotics)    Prior to Admission medications    Medication Sig Start Date End Date Taking? Authorizing Provider   diphenhydrAMINE (BENADRYL) 50 mg capsule Take 50 mg by mouth every 6 (six) hours as needed for itching.   Yes Provider, Historical, " MD   diphenhydramine HCl (UNISOM, DIPHENHYDRAMINE, ORAL) Take by mouth.   Yes Geoffrey Almodovar MD   FERRALET 90 DUAL-IRON DELIVERY 90-1-12-50 mg-mg-mcg-mg tablet Take 1 tablet by mouth daily. 8/22/18  Yes Olu Fountain MD   LORazepam (ATIVAN) 1 mg tablet Take 1 mg by mouth every 6 (six) hours as needed for anxiety.   Yes Geoffrey Almodovar MD   magnesium chloride 64 mg tablet,delayed release (DR/EC) Take by mouth daily.   Yes Geoffrey Almodovar MD   pantoprazole (PROTONIX) 40 mg EC tablet Take 1 tablet (40 mg total) by mouth daily. 7/26/18 1/22/19 Yes Olu Fountain MD   prenat.vits,yefri,min-iron-folic (PRENATAL VITAMIN) tablet take 1 tablet by oral route  every day    ProviderGeoffrey MD       Review of Systems  Pertinent items are noted in HPI.    Objective     Vital Signs for the last 24 hours:  Temp:  [36.5 °C (97.7 °F)] 36.5 °C (97.7 °F)  Heart Rate:  [98] 98  Resp:  [20] 20  BP: (138)/(84) 138/84    Latest cervical exam:  Cervix appears 1cm dilated and long during SSE    Additional Tests:   Sterile Speculum Exam: yes  Bleeding: moderate amount of mucous textured discharge in the vault with scant streaks of blood within the mucous; no active bleeding noted    Fetal Monitoring:  FHR Baseline: 140  FHR Variability: moderate  FHR Accelerations: present  FHR Decelerations: absent    Contraction Frequency: absent    Exam:  General Appearance: Alert, cooperative, no acute distress  Lungs: Clear to auscultation bilaterally, respirations unlabored  Heart: Regular rate and rhythm  Abdomen: gravid, nontender  Genitalia: See vaginal exam  Extremities: no edema or calf tenderness  Neurologic: grossly intact without focal deficits    Ultrasounds:   I have reviewed the applicable Ultrasounds.    Labs:  Strep Gp B KEDNAL+Rflx   Date Value Ref Range Status   10/16/2018 Negative Negative Final     Comment:     Centers for Disease Control and Prevention (CDC) and American Congress  of Obstetricians and  Gynecologists (ACOG) guidelines for prevention of   group B streptococcal (GBS) disease specify co-collection of  a vaginal and rectal swab specimen to maximize sensitivity of GBS  detection. Per the CDC and ACOG, swabbing both the lower vagina and  rectum substantially increases the yield of detection compared with  sampling the vagina alone.  Penicillin G, ampicillin, or cefazolin are indicated for intrapartum  prophylaxis of  GBS colonization. Reflex susceptibility  testing should be performed prior to use of clindamycin only on GBS  isolates from penicillin-allergic women who are considered a high risk  for anaphylaxis. Treatment with vancomycin without additional testing  is warranted if resistance to clindamycin is noted.         Assessment/Plan     Jennifer Amador is a 42 y.o. female  at 36w0d admitted for observation secondary to vaginal bleeding    -FHR: Reactive  -GBS: negative   -VB: pt with known posterior placenta previa; no active bleeding noted with SSE; moderate amount of mucous textured discharge with scant streaks of blood within the mucous noted in vault, c/w mucous plug; cervix 1/long with visualization with SSE; pt stable and is for d/c home  -Pt stable for discharge and is to report to L&D on 10/25/18 for scheduled C/S; pt provided with pretrm labor, fetal movement, placenta previa, and VB education; pt verbalized understanding of discharge instructions and education  -d/w Dr. Mk Dave, IRVING

## 2018-10-22 ENCOUNTER — HOSPITAL ENCOUNTER (INPATIENT)
Facility: HOSPITAL | Age: 43
LOS: 4 days | Discharge: HOME | End: 2018-10-27
Attending: OBSTETRICS & GYNECOLOGY | Admitting: OBSTETRICS & GYNECOLOGY
Payer: COMMERCIAL

## 2018-10-22 DIAGNOSIS — Z3A.36 36 WEEKS GESTATION OF PREGNANCY: Primary | ICD-10-CM

## 2018-10-23 ENCOUNTER — ANESTHESIA (INPATIENT)
Dept: OBSTETRICS AND GYNECOLOGY | Facility: HOSPITAL | Age: 43
End: 2018-10-23
Payer: COMMERCIAL

## 2018-10-23 ENCOUNTER — ANESTHESIA EVENT (INPATIENT)
Dept: OBSTETRICS AND GYNECOLOGY | Facility: HOSPITAL | Age: 43
End: 2018-10-23
Payer: COMMERCIAL

## 2018-10-23 VITALS — HEART RATE: 92 BPM | OXYGEN SATURATION: 97 %

## 2018-10-23 LAB
ABO + RH BLD: NORMAL
BLD GP AB SCN SERPL QL: NEGATIVE
D AG BLD QL: POSITIVE
ERYTHROCYTE [DISTWIDTH] IN BLOOD BY AUTOMATED COUNT: 14.6 % (ref 11.7–14.4)
HCT VFR BLDCO AUTO: 33.5 % (ref 35–45)
HGB BLD-MCNC: 11.3 G/DL (ref 11.8–15.7)
LABORATORY COMMENT REPORT: NORMAL
MCH RBC QN AUTO: 28.5 PG (ref 28–33.2)
MCHC RBC AUTO-ENTMCNC: 33.7 G/DL (ref 32.2–35.5)
MCV RBC AUTO: 84.4 FL (ref 83–98)
PDW BLD AUTO: 10 FL (ref 9.4–12.3)
PLATELET # BLD AUTO: 274 K/UL (ref 150–369)
RBC # BLD AUTO: 3.97 M/UL (ref 3.93–5.22)
RPR SER QL: NORMAL
WBC # BLD AUTO: 7.59 K/UL (ref 3.8–10.5)

## 2018-10-23 PROCEDURE — 63700000 HC SELF-ADMINISTRABLE DRUG

## 2018-10-23 PROCEDURE — 85027 COMPLETE CBC AUTOMATED: CPT | Performed by: STUDENT IN AN ORGANIZED HEALTH CARE EDUCATION/TRAINING PROGRAM

## 2018-10-23 PROCEDURE — 36415 COLL VENOUS BLD VENIPUNCTURE: CPT | Performed by: STUDENT IN AN ORGANIZED HEALTH CARE EDUCATION/TRAINING PROGRAM

## 2018-10-23 PROCEDURE — 63700000 HC SELF-ADMINISTRABLE DRUG: Performed by: OBSTETRICS & GYNECOLOGY

## 2018-10-23 PROCEDURE — 27200121 HC CATH FOLEY

## 2018-10-23 PROCEDURE — 37000010 ANESTHESIA SPINAL BLOCK: Performed by: SPECIALIST

## 2018-10-23 PROCEDURE — 12000000 HC ROOM AND CARE MED/SURG

## 2018-10-23 PROCEDURE — 88307 TISSUE EXAM BY PATHOLOGIST: CPT | Performed by: OBSTETRICS & GYNECOLOGY

## 2018-10-23 PROCEDURE — 63600000 HC DRUGS/DETAIL CODE: Performed by: OBSTETRICS & GYNECOLOGY

## 2018-10-23 PROCEDURE — 86900 BLOOD TYPING SEROLOGIC ABO: CPT

## 2018-10-23 PROCEDURE — 25000000 HC PHARMACY GENERAL: Performed by: OBSTETRICS & GYNECOLOGY

## 2018-10-23 PROCEDURE — 63700000 HC SELF-ADMINISTRABLE DRUG: Performed by: STUDENT IN AN ORGANIZED HEALTH CARE EDUCATION/TRAINING PROGRAM

## 2018-10-23 PROCEDURE — 59510 CESAREAN DELIVERY: CPT | Performed by: OBSTETRICS & GYNECOLOGY

## 2018-10-23 PROCEDURE — 37000010 HC ANESTHESIA SPINAL: Performed by: OBSTETRICS & GYNECOLOGY

## 2018-10-23 PROCEDURE — 63600000 HC DRUGS/DETAIL CODE: Performed by: NURSE ANESTHETIST, CERTIFIED REGISTERED

## 2018-10-23 PROCEDURE — 71000001 HC PACU PHASE 1 INITIAL 30MIN: Performed by: OBSTETRICS & GYNECOLOGY

## 2018-10-23 PROCEDURE — 25000000 HC PHARMACY GENERAL

## 2018-10-23 PROCEDURE — 86920 COMPATIBILITY TEST SPIN: CPT

## 2018-10-23 PROCEDURE — 25000000 HC PHARMACY GENERAL: Performed by: STUDENT IN AN ORGANIZED HEALTH CARE EDUCATION/TRAINING PROGRAM

## 2018-10-23 PROCEDURE — 25800000 HC PHARMACY IV SOLUTIONS: Performed by: STUDENT IN AN ORGANIZED HEALTH CARE EDUCATION/TRAINING PROGRAM

## 2018-10-23 PROCEDURE — 71000011 HC PACU PHASE 1 EA ADDL MIN: Performed by: OBSTETRICS & GYNECOLOGY

## 2018-10-23 PROCEDURE — 63600000 HC DRUGS/DETAIL CODE: Performed by: SPECIALIST

## 2018-10-23 PROCEDURE — 86592 SYPHILIS TEST NON-TREP QUAL: CPT | Performed by: STUDENT IN AN ORGANIZED HEALTH CARE EDUCATION/TRAINING PROGRAM

## 2018-10-23 PROCEDURE — 72000022 HC C SECTION LEVEL 2: Performed by: OBSTETRICS & GYNECOLOGY

## 2018-10-23 PROCEDURE — 25000000 HC PHARMACY GENERAL: Performed by: NURSE ANESTHETIST, CERTIFIED REGISTERED

## 2018-10-23 PROCEDURE — 63600000 HC DRUGS/DETAIL CODE: Performed by: STUDENT IN AN ORGANIZED HEALTH CARE EDUCATION/TRAINING PROGRAM

## 2018-10-23 PROCEDURE — 63700000 HC SELF-ADMINISTRABLE DRUG: Performed by: NURSE ANESTHETIST, CERTIFIED REGISTERED

## 2018-10-23 RX ORDER — OXYTOCIN/0.9 % SODIUM CHLORIDE 20/1000 ML
125 PLASTIC BAG, INJECTION (ML) INTRAVENOUS CONTINUOUS
Status: DISPENSED | OUTPATIENT
Start: 2018-10-23 | End: 2018-10-23

## 2018-10-23 RX ORDER — MISOPROSTOL 100 UG/1
200 TABLET ORAL ONCE
Status: COMPLETED | OUTPATIENT
Start: 2018-10-23 | End: 2018-10-23

## 2018-10-23 RX ORDER — ALUMINUM HYDROXIDE, MAGNESIUM HYDROXIDE, AND SIMETHICONE 1200; 120; 1200 MG/30ML; MG/30ML; MG/30ML
30 SUSPENSION ORAL EVERY 4 HOURS PRN
Status: DISCONTINUED | OUTPATIENT
Start: 2018-10-23 | End: 2018-10-27 | Stop reason: HOSPADM

## 2018-10-23 RX ORDER — ONDANSETRON HYDROCHLORIDE 2 MG/ML
4 INJECTION, SOLUTION INTRAVENOUS EVERY 6 HOURS PRN
Status: CANCELLED | OUTPATIENT
Start: 2019-01-21 | End: 2019-04-21

## 2018-10-23 RX ORDER — PROCHLORPERAZINE EDISYLATE 5 MG/ML
10 INJECTION INTRAMUSCULAR; INTRAVENOUS EVERY 6 HOURS PRN
Status: CANCELLED | OUTPATIENT
Start: 2018-10-23 | End: 2019-01-21

## 2018-10-23 RX ORDER — LANOLIN
1 WAX (GRAM) MISCELLANEOUS AS NEEDED
Status: DISCONTINUED | OUTPATIENT
Start: 2018-10-23 | End: 2018-10-27 | Stop reason: HOSPADM

## 2018-10-23 RX ORDER — HYDROMORPHONE HYDROCHLORIDE 1 MG/ML
0.5 INJECTION, SOLUTION INTRAMUSCULAR; INTRAVENOUS; SUBCUTANEOUS
Status: DISCONTINUED | OUTPATIENT
Start: 2018-10-23 | End: 2018-10-23

## 2018-10-23 RX ORDER — MISOPROSTOL 100 MCG
TABLET ORAL AS NEEDED
Status: DISCONTINUED | OUTPATIENT
Start: 2018-10-23 | End: 2018-10-23 | Stop reason: SURG

## 2018-10-23 RX ORDER — ACETAMINOPHEN 650 MG/1
650 SUPPOSITORY RECTAL ONCE
Status: COMPLETED | OUTPATIENT
Start: 2018-10-23 | End: 2018-10-23

## 2018-10-23 RX ORDER — SODIUM CHLORIDE, SODIUM LACTATE, POTASSIUM CHLORIDE, CALCIUM CHLORIDE 600; 310; 30; 20 MG/100ML; MG/100ML; MG/100ML; MG/100ML
1000 INJECTION, SOLUTION INTRAVENOUS ONCE
Status: COMPLETED | OUTPATIENT
Start: 2018-10-23 | End: 2018-10-23

## 2018-10-23 RX ORDER — PHENYLEPHRINE HYDROCHLORIDE 10 MG/ML
INJECTION INTRAVENOUS AS NEEDED
Status: DISCONTINUED | OUTPATIENT
Start: 2018-10-23 | End: 2018-10-23 | Stop reason: SURG

## 2018-10-23 RX ORDER — DIPHENHYDRAMINE HCL 50 MG/ML
25 VIAL (ML) INJECTION EVERY 6 HOURS PRN
Status: DISCONTINUED | OUTPATIENT
Start: 2018-10-23 | End: 2018-10-27 | Stop reason: HOSPADM

## 2018-10-23 RX ORDER — NALOXONE HYDROCHLORIDE 0.4 MG/ML
0.4 INJECTION, SOLUTION INTRAMUSCULAR; INTRAVENOUS; SUBCUTANEOUS AS NEEDED
Status: CANCELLED | OUTPATIENT
Start: 2018-10-23 | End: 2019-01-21

## 2018-10-23 RX ORDER — AMOXICILLIN 250 MG
1 CAPSULE ORAL 2 TIMES DAILY
Status: DISCONTINUED | OUTPATIENT
Start: 2018-10-23 | End: 2018-10-27 | Stop reason: HOSPADM

## 2018-10-23 RX ORDER — ONDANSETRON HYDROCHLORIDE 2 MG/ML
4 INJECTION, SOLUTION INTRAVENOUS EVERY 8 HOURS PRN
Status: DISCONTINUED | OUTPATIENT
Start: 2018-10-23 | End: 2018-10-27 | Stop reason: HOSPADM

## 2018-10-23 RX ORDER — POLYETHYLENE GLYCOL 3350 17 G/17G
17 POWDER, FOR SOLUTION ORAL DAILY PRN
Status: DISCONTINUED | OUTPATIENT
Start: 2018-10-23 | End: 2018-10-27 | Stop reason: HOSPADM

## 2018-10-23 RX ORDER — ACETAMINOPHEN 325 MG/1
975 TABLET ORAL
Status: DISCONTINUED | OUTPATIENT
Start: 2018-10-23 | End: 2018-10-27 | Stop reason: HOSPADM

## 2018-10-23 RX ORDER — METHYLERGONOVINE MALEATE 0.2 MG/1
200 TABLET ORAL EVERY 6 HOURS
Status: COMPLETED | OUTPATIENT
Start: 2018-10-23 | End: 2018-10-24

## 2018-10-23 RX ORDER — SODIUM CHLORIDE 9 MG/ML
5 INJECTION, SOLUTION INTRAVENOUS AS NEEDED
Status: DISCONTINUED | OUTPATIENT
Start: 2018-10-23 | End: 2018-10-23

## 2018-10-23 RX ORDER — ACETAMINOPHEN 650 MG/20.3ML
1000 LIQUID ORAL ONCE
Status: COMPLETED | OUTPATIENT
Start: 2018-10-23 | End: 2018-10-23

## 2018-10-23 RX ORDER — DIPHENHYDRAMINE HCL 25 MG
25 CAPSULE ORAL EVERY 6 HOURS PRN
Status: DISCONTINUED | OUTPATIENT
Start: 2018-10-23 | End: 2018-10-27 | Stop reason: HOSPADM

## 2018-10-23 RX ORDER — LABETALOL HYDROCHLORIDE 5 MG/ML
5 INJECTION, SOLUTION INTRAVENOUS AS NEEDED
Status: CANCELLED | OUTPATIENT
Start: 2018-10-23

## 2018-10-23 RX ORDER — DIBUCAINE 1 %
1 OINTMENT (GRAM) TOPICAL AS NEEDED
Status: DISCONTINUED | OUTPATIENT
Start: 2018-10-23 | End: 2018-10-27 | Stop reason: HOSPADM

## 2018-10-23 RX ORDER — CALCIUM CARBONATE 200(500)MG
500 TABLET,CHEWABLE ORAL EVERY 4 HOURS PRN
Status: DISCONTINUED | OUTPATIENT
Start: 2018-10-23 | End: 2018-10-27 | Stop reason: HOSPADM

## 2018-10-23 RX ORDER — CEFAZOLIN SODIUM/WATER 1 G/10 ML
2 SYRINGE (ML) INTRAVENOUS
Status: COMPLETED | OUTPATIENT
Start: 2018-10-23 | End: 2018-10-23

## 2018-10-23 RX ORDER — BISACODYL 10 MG/1
10 SUPPOSITORY RECTAL DAILY PRN
Status: DISCONTINUED | OUTPATIENT
Start: 2018-10-23 | End: 2018-10-27 | Stop reason: HOSPADM

## 2018-10-23 RX ORDER — IBUPROFEN 600 MG/1
600 TABLET ORAL
Status: DISCONTINUED | OUTPATIENT
Start: 2018-10-25 | End: 2018-10-27 | Stop reason: HOSPADM

## 2018-10-23 RX ORDER — OXYCODONE HYDROCHLORIDE 5 MG/1
5-10 TABLET ORAL EVERY 4 HOURS PRN
Status: DISCONTINUED | OUTPATIENT
Start: 2018-10-23 | End: 2018-10-27 | Stop reason: HOSPADM

## 2018-10-23 RX ORDER — KETOROLAC TROMETHAMINE 30 MG/ML
30 INJECTION, SOLUTION INTRAMUSCULAR; INTRAVENOUS
Status: COMPLETED | OUTPATIENT
Start: 2018-10-23 | End: 2018-10-25

## 2018-10-23 RX ORDER — OXYTOCIN/RINGER'S LACTATE 20/1000 ML
PLASTIC BAG, INJECTION (ML) INTRAVENOUS CONTINUOUS PRN
Status: DISCONTINUED | OUTPATIENT
Start: 2018-10-23 | End: 2018-10-23 | Stop reason: SURG

## 2018-10-23 RX ORDER — ONDANSETRON 4 MG/1
4 TABLET, ORALLY DISINTEGRATING ORAL EVERY 8 HOURS PRN
Status: DISCONTINUED | OUTPATIENT
Start: 2018-10-23 | End: 2018-10-27 | Stop reason: HOSPADM

## 2018-10-23 RX ORDER — SODIUM CHLORIDE, SODIUM LACTATE, POTASSIUM CHLORIDE, CALCIUM CHLORIDE 600; 310; 30; 20 MG/100ML; MG/100ML; MG/100ML; MG/100ML
INJECTION, SOLUTION INTRAVENOUS CONTINUOUS PRN
Status: DISCONTINUED | OUTPATIENT
Start: 2018-10-23 | End: 2018-10-23 | Stop reason: SURG

## 2018-10-23 RX ORDER — ACETAMINOPHEN 325 MG/1
975 TABLET ORAL ONCE
Status: COMPLETED | OUTPATIENT
Start: 2018-10-23 | End: 2018-10-23

## 2018-10-23 RX ORDER — DIPHENHYDRAMINE HCL 50 MG/ML
12.5 VIAL (ML) INJECTION EVERY 6 HOURS PRN
Status: CANCELLED | OUTPATIENT
Start: 2018-10-23 | End: 2019-01-21

## 2018-10-23 RX ORDER — FENTANYL CITRATE 50 UG/ML
50 INJECTION, SOLUTION INTRAMUSCULAR; INTRAVENOUS EVERY 10 MIN PRN
Status: DISCONTINUED | OUTPATIENT
Start: 2018-10-23 | End: 2018-10-23

## 2018-10-23 RX ORDER — SODIUM CHLORIDE, SODIUM LACTATE, POTASSIUM CHLORIDE, CALCIUM CHLORIDE 600; 310; 30; 20 MG/100ML; MG/100ML; MG/100ML; MG/100ML
80 INJECTION, SOLUTION INTRAVENOUS CONTINUOUS
Status: DISCONTINUED | OUTPATIENT
Start: 2018-10-23 | End: 2018-10-27 | Stop reason: HOSPADM

## 2018-10-23 RX ORDER — ONDANSETRON HYDROCHLORIDE 2 MG/ML
4 INJECTION, SOLUTION INTRAVENOUS
Status: CANCELLED | OUTPATIENT
Start: 2018-10-23

## 2018-10-23 RX ORDER — NALOXONE HYDROCHLORIDE 0.4 MG/ML
0.1 INJECTION, SOLUTION INTRAMUSCULAR; INTRAVENOUS; SUBCUTANEOUS
Status: CANCELLED | OUTPATIENT
Start: 2018-10-23 | End: 2019-01-21

## 2018-10-23 RX ORDER — ONDANSETRON 8 MG/1
8 TABLET, ORALLY DISINTEGRATING ORAL ONCE
Status: COMPLETED | OUTPATIENT
Start: 2018-10-23 | End: 2018-10-23

## 2018-10-23 RX ORDER — LORAZEPAM 0.5 MG/1
1 TABLET ORAL ONCE
Status: COMPLETED | OUTPATIENT
Start: 2018-10-23 | End: 2018-10-23

## 2018-10-23 RX ORDER — SODIUM CITRATE AND CITRIC ACID MONOHYDRATE 334; 500 MG/5ML; MG/5ML
30 SOLUTION ORAL ONCE
Status: DISCONTINUED | OUTPATIENT
Start: 2018-10-23 | End: 2018-10-23 | Stop reason: HOSPADM

## 2018-10-23 RX ADMIN — MISOPROSTOL 400 MCG: 100 TABLET ORAL at 03:15

## 2018-10-23 RX ADMIN — METHYLERGONOVINE MALEATE 200 MCG: 0.2 TABLET ORAL at 11:56

## 2018-10-23 RX ADMIN — PHENYLEPHRINE HYDROCHLORIDE 100 MCG: 10 INJECTION INTRAVENOUS at 02:03

## 2018-10-23 RX ADMIN — ALUMINUM HYDROXIDE, MAGNESIUM HYDROXIDE, AND SIMETHICONE 30 ML: 200; 200; 20 SUSPENSION ORAL at 15:41

## 2018-10-23 RX ADMIN — METHYLERGONOVINE MALEATE 200 MCG: 0.2 TABLET ORAL at 07:01

## 2018-10-23 RX ADMIN — ACETAMINOPHEN 975 MG: 325 TABLET ORAL at 00:59

## 2018-10-23 RX ADMIN — SODIUM CHLORIDE, SODIUM LACTATE, POTASSIUM CHLORIDE, CALCIUM CHLORIDE: 600; 310; 30; 20 INJECTION, SOLUTION INTRAVENOUS at 01:18

## 2018-10-23 RX ADMIN — ONDANSETRON 8 MG: 8 TABLET, ORALLY DISINTEGRATING ORAL at 00:58

## 2018-10-23 RX ADMIN — Medication: at 01:50

## 2018-10-23 RX ADMIN — METHYLERGONOVINE MALEATE 200 MCG: 0.2 TABLET ORAL at 17:54

## 2018-10-23 RX ADMIN — SENNOSIDES AND DOCUSATE SODIUM 1 TABLET: 8.6; 5 TABLET ORAL at 19:07

## 2018-10-23 RX ADMIN — OXYCODONE HYDROCHLORIDE 5 MG: 5 TABLET ORAL at 12:59

## 2018-10-23 RX ADMIN — TRANEXAMIC ACID 1000 MG: 100 INJECTION, SOLUTION INTRAVENOUS at 01:54

## 2018-10-23 RX ADMIN — SODIUM CHLORIDE, SODIUM LACTATE, POTASSIUM CHLORIDE, CALCIUM CHLORIDE 80 ML/HR: 600; 310; 30; 20 INJECTION, SOLUTION INTRAVENOUS at 00:58

## 2018-10-23 RX ADMIN — ACETAMINOPHEN 975 MG: 325 TABLET ORAL at 13:00

## 2018-10-23 RX ADMIN — ALUMINUM HYDROXIDE, MAGNESIUM HYDROXIDE, AND SIMETHICONE 30 ML: 200; 200; 20 SUSPENSION ORAL at 21:51

## 2018-10-23 RX ADMIN — AZITHROMYCIN DIHYDRATE 500 MG: 500 INJECTION, POWDER, LYOPHILIZED, FOR SOLUTION INTRAVENOUS at 01:03

## 2018-10-23 RX ADMIN — KETOROLAC TROMETHAMINE 30 MG: 30 INJECTION, SOLUTION INTRAMUSCULAR at 09:54

## 2018-10-23 RX ADMIN — ACETAMINOPHEN 975 MG: 325 TABLET ORAL at 07:01

## 2018-10-23 RX ADMIN — KETOROLAC TROMETHAMINE 30 MG: 30 INJECTION, SOLUTION INTRAMUSCULAR at 03:49

## 2018-10-23 RX ADMIN — Medication 125 ML/HR: at 09:48

## 2018-10-23 RX ADMIN — Medication 125 ML/HR: at 01:55

## 2018-10-23 RX ADMIN — LORAZEPAM 1 MG: 0.5 TABLET ORAL at 22:00

## 2018-10-23 RX ADMIN — DIPHENHYDRAMINE HYDROCHLORIDE 25 MG: 25 CAPSULE ORAL at 12:28

## 2018-10-23 RX ADMIN — CEFAZOLIN SODIUM 2 G: 100 INJECTION, SOLUTION INTRAVENOUS at 01:03

## 2018-10-23 RX ADMIN — ACETAMINOPHEN 975 MG: 325 TABLET ORAL at 19:07

## 2018-10-23 RX ADMIN — OXYCODONE HYDROCHLORIDE 5 MG: 5 TABLET ORAL at 19:26

## 2018-10-23 RX ADMIN — PHENYLEPHRINE HYDROCHLORIDE 100 MCG: 10 INJECTION INTRAVENOUS at 01:55

## 2018-10-23 RX ADMIN — PHENYLEPHRINE HYDROCHLORIDE 100 MCG: 10 INJECTION INTRAVENOUS at 01:40

## 2018-10-23 RX ADMIN — KETOROLAC TROMETHAMINE 30 MG: 30 INJECTION, SOLUTION INTRAMUSCULAR at 22:59

## 2018-10-23 RX ADMIN — SODIUM CHLORIDE, SODIUM LACTATE, POTASSIUM CHLORIDE, CALCIUM CHLORIDE 1000 ML: 600; 310; 30; 20 INJECTION, SOLUTION INTRAVENOUS at 00:27

## 2018-10-23 RX ADMIN — HYDROMORPHONE HYDROCHLORIDE 0.5 MG: 1 INJECTION, SOLUTION INTRAMUSCULAR; INTRAVENOUS; SUBCUTANEOUS at 05:53

## 2018-10-23 RX ADMIN — MISOPROSTOL 800 MCG: 100 TABLET ORAL at 02:00

## 2018-10-23 RX ADMIN — KETOROLAC TROMETHAMINE 30 MG: 30 INJECTION, SOLUTION INTRAMUSCULAR at 15:41

## 2018-10-23 ASSESSMENT — PAIN SCALES - GENERAL: PAIN_LEVEL: 1

## 2018-10-23 NOTE — LACTATION NOTE
Mom pumping for 36.5 week baby in NICU. Reviewed pumping guidelines, pump part care and milk labeling and storage. Has new pump at home. Will follow up tomorrow.

## 2018-10-23 NOTE — ANESTHESIA PREPROCEDURE EVALUATION
Anesthesia ROS/MED HX    Anesthesia History    Placenta previa  Endo/Other  Body Habitus: Normal      Relevant Problems   No relevant active problems       Physical Exam    Airway   Mallampati: II   TM distance: >3 FB   Neck ROM: full  Cardiovascular    Rhythm: regular   Rate: normal        Anesthesia Plan    Plan: spinal   ASA 2 - emergent  Anesthetic plan and risks discussed with: patient

## 2018-10-23 NOTE — PLAN OF CARE
Problem: Patient Care Overview  Goal: Plan of Care Review  Outcome: Ongoing (interventions implemented as appropriate)   10/23/18 7487   Coping/Psychosocial   Plan Of Care Reviewed With patient

## 2018-10-23 NOTE — PROGRESS NOTES
Obstetrics Postpartum Progress Note    Events  Pt seen/examined. Reports pain well controlled since her . Thirsty and tolerating liquids without difficulty. No nausea or vomiting. Celestin in place. Not yet out of bed. Minimal bleeding per nursing.     Denies chest pain, shortness of breath, fevers chills.     Vitals  Temp:  [36.4 °C (97.6 °F)-36.7 °C (98.1 °F)] 36.6 °C (97.9 °F)  Heart Rate:  [] 93  Resp:  [16-18] 18  BP: (124-154)/(60-75) 133/75    I&O    Intake/Output Summary (Last 24 hours) at 10/23/18 0641  Last data filed at 10/23/18 0639   Gross per 24 hour   Intake             5250 ml   Output             2250 ml   Net             3000 ml       Physical Exam  General: A&Ox3 and NAD   Heart: Regular rate and rhythm  Lungs: Clear to auscultation bilaterally  Abdomen: soft, nondistended, non-tender, no rebound/rigidity/guarding.  Incision: dressing clean, dry, intact  Fundus: firm and below umbilicus    Scant lochia on pad  Extremities: symmetric and no edema    Labs  Labs Reviewed:  Lab Results   Component Value Date    ABO O 10/23/2018    LABRH Positive 10/23/2018      Rubella: equivalent, for MMR post partum     Assessment/Plan   Problem-based Assessment and Plan    Jennifer Amador is a 42 y.o.  POD# 0 s/p , Low Transverse .    1. Vital Signs: stable  2. Hemodynamics: stable, CBC pending  3. Pain: controlled  4. VTE Assessment: SCDs, encouraged ambulation tomorrow  5. Vaccinations/Rhogam: rhogam not indicated, needs MMR   6. Post care: meeting goals appropriately at this time  7. Post partum hemorrhage: vitals stable. Lochia minimal. Will continue to closely monitor    Chely Bernal MD

## 2018-10-23 NOTE — PLAN OF CARE
Problem: Patient Care Overview  Goal: Individualization & Mutuality  Outcome: Ongoing (interventions implemented as appropriate)   10/23/18 1332   Individualization   Patient Specific Preferences pump breast for baby in nicu   Patient Specific Goals pump 8 times in 24 hrs

## 2018-10-23 NOTE — ANESTHESIA POSTPROCEDURE EVALUATION
Patient: Jennifer Amador    Procedure Summary     Date:  10/23/18 Room / Location:  [facility] L&D OR    Anesthesia Start:  118 Anesthesia Stop:  325    Procedure:   SECTION (N/A ) Diagnosis:  (Other (Add Comments))    Surgeon:  Olu Fountain MD Responsible Provider:  Gutierrez Simeon MD    Anesthesia Type:  spinal ASA Status:  2 - Emergent          Anesthesia Type: spinal  PACU Vitals  10/23/2018 0313 - 10/23/2018 0338      10/23/2018 0313             Pulse: 92    SpO2: 97 %            Anesthesia Post Evaluation    Pain score: 1  Pain management: satisfactory to patient  Mode of pain management: additional block medications  Patient location during evaluation: PACU  Patient participation: complete - patient participated  Level of consciousness: awake and alert  Cardiovascular status: acceptable  Airway Patency: adequate  Respiratory status: acceptable  Hydration status: stable  Pain well controlled after spinal preservative free morphine administration: Yes  Continue 24 hours observation after preservative free morphine administration: Yes  Anesthetic complications: no

## 2018-10-23 NOTE — PLAN OF CARE
Problem: Patient Care Overview  Goal: Interprofessional Rounds/Family Conf  Outcome: Ongoing (interventions implemented as appropriate)   10/23/18 2439   Interdisciplinary Rounds/Family Conf   Participants social work/services

## 2018-10-23 NOTE — H&P
MARY JANE Amador is a 42 y.o. female  at 36w5d with an estimated due date of 11/15/2018, by Ultrasound who presents with contractions. She has a known posterior placenta previa and has been seen on L&D for vaginal bleeding, s/p a complete course of BMTZ on 10/9-10. She was seen by MFM and was told that she should have a very low threshold for delivery if she were to go into labor.     She reports worsening cramping that started around 8:30pm. She had a lot to drink today and denies of urinary or vaginal infection symptoms.  She reports she has been having mucous discharge since 10/18. She denies LOF/VB, reports good FM.     She denies gHTN/GDM in this pregnancy.    Last PO intake:  Last Food Intake Date: 10/22/18, Last Food Intake Time:     OB History:   Obstetric History       T1      L1     SAB2   TAB0   Ectopic0   Multiple0   Live Births1       # Outcome Date GA Lbr Angel/2nd Weight Sex Delivery Anes PTL Lv   4 Current            3 SAB            2 SAB            1 Term    3.402 kg  Vag-Vacuum             GYN History: Denies history of fibroids and ovarian cysts. She reports history of abnl pap 5 years ago s/p colposcopy and subsequent normal Pap.     Medical History:   Past Medical History:   Diagnosis Date   • Anemia    Denies asthma and HTN.    Surgical History:   Past Surgical History:   Procedure Laterality Date   • COLPOSCOPY     • MYRINGOTOMY     • MYRINGOTOMY TUBE REMOVAL         Social History:   Social History     Social History   • Marital status:      Spouse name: Elli   • Number of children: N/A   • Years of education: N/A     Social History Main Topics   • Smoking status: Former Smoker     Quit date:    • Smokeless tobacco: Never Used   • Alcohol use No   • Drug use: No   • Sexual activity: Yes     Partners: Female     Birth control/ protection: None     Other Topics Concern   • None     Social History Narrative   • None        Family History:    Family History   Problem Relation Age of Onset   • Cancer Father    • Breast cancer Maternal Grandmother        Allergies: Sulfa (sulfonamide antibiotics)    Prior to Admission medications    Medication Sig Start Date End Date Taking? Authorizing Provider   diphenhydrAMINE (BENADRYL) 50 mg capsule Take 50 mg by mouth every 6 (six) hours as needed for itching.   Yes Geoffrey Almodovar MD   diphenhydramine HCl (UNISOM, DIPHENHYDRAMINE, ORAL) Take by mouth.   Yes Geoffrey Almodovar MD   FERRALET 90 DUAL-IRON DELIVERY 90-1-12-50 mg-mg-mcg-mg tablet Take 1 tablet by mouth daily. 8/22/18  Yes Olu Fountain MD   LORazepam (ATIVAN) 1 mg tablet Take 1 mg by mouth every 6 (six) hours as needed for anxiety.   Yes Geoffrey Almodovar MD   magnesium chloride 64 mg tablet,delayed release (DR/EC) Take by mouth daily.   Yes Geoffrey Almodovar MD   pantoprazole (PROTONIX) 40 mg EC tablet Take 1 tablet (40 mg total) by mouth daily. 7/26/18 1/22/19 Yes Olu Fountain MD   prenat.vits,yefri,min-iron-folic (PRENATAL VITAMIN) tablet take 1 tablet by oral route  every day   Yes Geoffrey Almodovar MD       Review of Systems  Pertinent items are noted in HPI.    Objective     Vital Signs for the last 24 hours:  Temp:  [36.7 °C (98.1 °F)] 36.7 °C (98.1 °F)    Latest cervical exam:  Cervical Dilation (cm): 2-3  Cervical Effacement: 0  Fetal Station: -3  Method: sterile speculum exam per physician (10/23/18 0006)    Sterile Speculum Exam: yes  Pooling:  no  Bleeding: negative  Cervix was easily visualized with sterile speculum exam. It appears 2cm dilated which is a changed from her las exam on 10/18. A copious amount of mucous discharge is noted in the vagina. When mucous was cleared carefully removed with q-tip, no bleeding was noted from the os.     Fetal Monitoring:  FHR Baseline: 130  FHR Variability: Moderate  FHR Accelerations: 15x15 >2  FHR Decelerations: absent    Contraction Frequency: q3-5min    Physical  Exam:  Gen: AAOx3, NAD  Heart: RRR, no murmur, rub or gallop  Lungs: CTAB  Abdomen: Gravid, nontender  Extremities: No edema bilaterally or calf tenderness  Neurologic: Grossly intact without focal deficits    Bedside Ultrasounds:   Cephalic    PTC 10/16:  2828g 6#4 54%  Posterior low lying placenta    Assessment/Plan     Jennifer Amador is a 42 y.o. female  at 36w5d admitted for contractions with known placenta previa.    1. FHR: Reactive  2. GBS: negative  3. Labor in the setting of known placenta previa   -Cervical dilation with contractions. Given her known diagnosis of posterior placenta previa, low threshold for .    -Admit to L&D,  preop routine labs, Ancef 2g and azithromycin to OR.    -BMI 24, Lovenox is not indicated. ERAS protocol.     Plan d/w Dr. Fountain.    Anamaria Dunn MD

## 2018-10-23 NOTE — PLAN OF CARE
Problem: Postpartum ( Delivery) (Adult,Obstetrics,Pediatric)  Goal: Signs and Symptoms of Listed Potential Problems Will be Absent, Minimized or Managed (Postpartum)  Outcome: Ongoing (interventions implemented as appropriate)   10/23/18 1341   Postpartum ( Delivery)   Problems Assessed (Postpartum ) all   Problems Present (Postpartum ) none

## 2018-10-23 NOTE — ANESTHESIA PROCEDURE NOTES
Spinal Block    Patient location during procedure: OR  End time: 10/23/2018 1:30 AM  Staffing  Anesthesiologist: CLARISSA HOLMAN  Performed: anesthesiologist   Reason for block: primary anesthetic  Preanesthetic Checklist  Completed: patient identified, site marked, surgical consent, pre-op evaluation, timeout performed, IV checked, risks and benefits discussed, monitors and equipment checked and sterile field maintained during procedure  Spinal Block  Patient position: left lateral decubitus  Prep: ChloraPrep  Patient monitoring: heart rate and continuous pulse ox  Approach: midline  Location: L4-5  Injection technique: single-shot  Needle  Needle type: Sprotte   Needle gauge: 24 G

## 2018-10-23 NOTE — OP NOTE
OB  Delivery OP Note    Date of Procedure: 10/23/2018  Patient:Jennifer Amador  :1975    Procedure:     SECTION  CPT(R) Code:  06673 - [place]  DELIVERY ONLY    Review the Delivery Report for details.      Details    Pre-Op Diagnosis: 1. 42 y.o.  Intrauterine pregnancy at 36w5d, labor.   2. Posterior placenta previa.   3. Advanced maternal age.    Post-Op Diagnosis: 1. 43 yo  s/p LTCS at 36w5d.   2. Placenta previa.   3. Advanced maternal age.   4. Post partum hemorrhage.    Procedure: Primary low transverse  via low transverse uterine incision and pfannenstiel skin incision.   Anesthesia: Spinal    Findings: Confirmed posterior placenta previa, easily removed, no evidence of accreta.   Normal uterus, tubes, and ovaries.  Viable male  weighing 3,120g with apgars 7,7,8 at 1,5,10 minutes delivered through clear fluid.    EBL  1000 mL   Complications: Post partum hemorrhage.      Specimen Information:  * No specimens in log *    Drains: Celestin draining clear urine.     Staff:  Surgeon(s):  Olu Fountain MD  Anesthesiologist: Gutierrez Simeon MD  CRNA: Shea Cartagena CRNA   Circulator: Melody Colin RN  Scrub Person: Krista Brown  Baby Nurse: Tereza Barr RN  Other Staff:     Delivery Assist;Delivery Nurse     INFANT INFORMATION  Time of Birth:1:53 AM   Presentation: Vertex   Position:Right ,Occiput ,Transverse ,    Cord:   ,Complications:     Sex: male   [place] Weight: 3.12 kg      1 Minute 5 Minute 10 Minute   Apgar Totals: 7    7    8        Information for the patient's :  Alex Amador  [[id number]]      Cord Gas     None          Informed Consent:  Patient presented complaining of contractions, was found to be visually dilated to 2-3cm and regularly arturo on TOCO. Given known placenta previa and high suspicion of labor decision was made to proceed with primary LTCS. Patient in agreement, consents  were confirmed with Dr. Fountain.     Procedure Details:  The patient was taken to the operating room where Spinal  anesthesia was placed and found to be adequate. Ancef and azithromycin antibiotics were given for infection prophylaxis. The patient was prepped and draped in the normal sterile fashion.  A timeout was performed.  A Pfannenstiel skin incision was made with the scalpel. The incision was carried down to the fascia using the bovie. The fascia was incised and this was extended laterally with the bovie. The fascia was grasped with Kocher clamps and the underlying rectus muscle was dissected off.  The rectus muscles were  bluntly. The peritoneum was identified and entered sharply with Metzenbaum scissors as it was elevated between hemostats. The peritoneum was dissected to allow for adequate visualization.    The bladder blade was inserted. The vesicouterine peritoneum was identified and a bladder flap created sharply. The lower uterine segment was elevated slightly between two allis clamps then incised with a low transverse incision and was extended bluntly. The amniotic sac was ruptured and clear fluid noted. The bladder blade was removed. The infants head was lifted to hysterotomy however there was difficulty. Kiwi vacuum was applied, gentle traction applied with a single pull and and the infant's head was delivered. The remainder of the infant was delivered and the infant appeared to be moving all 4 extremities. Following a 30 second delay the cord clamped and cut, and the baby was handed off to the awaiting clinicians and neonatology staff.    The uterus was exteriorized with the placenta in situ and the the placenta was carefully removed manually from the lower uterine segment. There was no evidence of accreta. Given previa, placenta was sent to pathology. The uterus was then exteriorized and cleared of all clots and debris. Lower uterine segment was noted to be bleeding. Infusion of 1g of  tranexamic acid was initiated at this time and IV push of 10 units of pitocin. Continued posterior lower uterine segment was noted. Two figure of 8 compression sutures were placed with #1 vicryl. 0.25mg Hemabate was injected the lower uterine segment. Surgicel followed by surgifoam was placed into the lower uterine segment and pressure was applied. 800 mcg of PO cytotec was administered. An additional surgifoam was placed in the lower uterine segment. Lower uterine segment bleeding was noted to be minimal at this time. The hysterotomy was then repaired with #1 Vicryl interrupted figure of eight sutures. Good hemostasis of the hysterotomy was observed. A second dose of 0.25mg hemabate was injected into the posterior lower uterine segment. The ovaries and tubes appeared normal bilaterally. The uterus was then returned to the abdomen. The uterine incision was reinspected and found to be hemostatic. The gutters were inspected and cleared of all debris. The rectus muscles were reapproximated with two verticle mattress sutures using 0 vicryl. The fascia was then reapproximated with a interrupted suture of #1 Vicryl. The subcutaneous tissue was closed with 3-0 vicryl rapide. The skin was closed with 4-0 Monocryl.    The uterus was expressed for normal quantity of lochia. 400mcg rectal cytotec was placed. EBL was called 1000mL. Given post partum hemorrhage will give methergine 0.2mg every 6 hours for 24 hours.     The patient tolerated the procedure well. The sponge, instrument, and needle counts were correct and the patient was taken to recovery in stable condition.    Dr. Cifuentes was scrubbed and present for the entire procedure.     Chely Bernal MD

## 2018-10-24 LAB
ERYTHROCYTE [DISTWIDTH] IN BLOOD BY AUTOMATED COUNT: 14.9 % (ref 11.7–14.4)
HCT VFR BLDCO AUTO: 26.6 % (ref 35–45)
HGB BLD-MCNC: 8.9 G/DL (ref 11.8–15.7)
MCH RBC QN AUTO: 28.7 PG (ref 28–33.2)
MCHC RBC AUTO-ENTMCNC: 33.5 G/DL (ref 32.2–35.5)
MCV RBC AUTO: 85.8 FL (ref 83–98)
PDW BLD AUTO: 10.2 FL (ref 9.4–12.3)
PLATELET # BLD AUTO: 235 K/UL (ref 150–369)
RBC # BLD AUTO: 3.1 M/UL (ref 3.93–5.22)
WBC # BLD AUTO: 10.83 K/UL (ref 3.8–10.5)

## 2018-10-24 PROCEDURE — 36415 COLL VENOUS BLD VENIPUNCTURE: CPT | Performed by: OBSTETRICS & GYNECOLOGY

## 2018-10-24 PROCEDURE — 63600000 HC DRUGS/DETAIL CODE: Performed by: OBSTETRICS & GYNECOLOGY

## 2018-10-24 PROCEDURE — 63700000 HC SELF-ADMINISTRABLE DRUG: Performed by: OBSTETRICS & GYNECOLOGY

## 2018-10-24 PROCEDURE — 12000000 HC ROOM AND CARE MED/SURG

## 2018-10-24 PROCEDURE — 85027 COMPLETE CBC AUTOMATED: CPT | Performed by: OBSTETRICS & GYNECOLOGY

## 2018-10-24 RX ADMIN — PRENATAL VIT W/ FE FUMARATE-FA TAB 27-0.8 MG 1 TABLET: 27-0.8 TAB at 09:15

## 2018-10-24 RX ADMIN — KETOROLAC TROMETHAMINE 30 MG: 30 INJECTION, SOLUTION INTRAMUSCULAR at 22:34

## 2018-10-24 RX ADMIN — KETOROLAC TROMETHAMINE 30 MG: 30 INJECTION, SOLUTION INTRAMUSCULAR at 05:15

## 2018-10-24 RX ADMIN — ACETAMINOPHEN 975 MG: 325 TABLET ORAL at 06:48

## 2018-10-24 RX ADMIN — METHYLERGONOVINE MALEATE 200 MCG: 0.2 TABLET ORAL at 00:31

## 2018-10-24 RX ADMIN — ACETAMINOPHEN 975 MG: 325 TABLET ORAL at 19:30

## 2018-10-24 RX ADMIN — SENNOSIDES AND DOCUSATE SODIUM 1 TABLET: 8.6; 5 TABLET ORAL at 09:15

## 2018-10-24 RX ADMIN — SENNOSIDES AND DOCUSATE SODIUM 1 TABLET: 8.6; 5 TABLET ORAL at 19:30

## 2018-10-24 RX ADMIN — KETOROLAC TROMETHAMINE 30 MG: 30 INJECTION, SOLUTION INTRAMUSCULAR at 12:05

## 2018-10-24 RX ADMIN — ANTACID TABLETS 1000 MG: 500 TABLET, CHEWABLE ORAL at 09:13

## 2018-10-24 RX ADMIN — OXYCODONE HYDROCHLORIDE 5 MG: 5 TABLET ORAL at 15:53

## 2018-10-24 RX ADMIN — OXYCODONE HYDROCHLORIDE 5 MG: 5 TABLET ORAL at 00:31

## 2018-10-24 RX ADMIN — POLYETHYLENE GLYCOL 3350 17 G: 17 POWDER, FOR SOLUTION ORAL at 15:53

## 2018-10-24 RX ADMIN — ANTACID TABLETS 500 MG: 500 TABLET, CHEWABLE ORAL at 05:22

## 2018-10-24 RX ADMIN — KETOROLAC TROMETHAMINE 30 MG: 30 INJECTION, SOLUTION INTRAMUSCULAR at 17:00

## 2018-10-24 RX ADMIN — ACETAMINOPHEN 975 MG: 325 TABLET ORAL at 13:22

## 2018-10-24 RX ADMIN — ACETAMINOPHEN 975 MG: 325 TABLET ORAL at 00:30

## 2018-10-24 NOTE — LACTATION NOTE
Mom pumping regularly for baby in NICU. Reviewed pumping guidelines, milk storage and labeling. Reassurance given regarding milk production. Will follow up tomorrow.

## 2018-10-24 NOTE — NURSING NOTE
Pt up to shower . Surgical dressing removed. Incision clean dry and intact. Pt denies c/o pain and discomfort

## 2018-10-24 NOTE — PROGRESS NOTES
Patient: Jennifer Amador  Procedure(s):   SECTION  Location: [facility] L&D OR    Last vitals:   Vitals:    10/23/18 2304   BP: 131/74   Pulse: 96   Resp: 18   Temp: 36.7 °C (98.1 °F)   SpO2:      Level of consciousness: Awake, Alert, Oriented  Post-anesthesia pain: Adequate analgesia  Anesthetic complications: None  Nausea and Vomiting Controled: Yes  Hydration: Adequate  Cardiovascular Function: Stable  Neuro Exam: WNL  Respiration: WNL  Pain is well controlled after spinal preservative free morphine administration and additional IV/PO meds:  Continue 24 hours observation after preservative free morphine administration:

## 2018-10-24 NOTE — PROGRESS NOTES
Obstetrics Postpartum Progress Note    Events  Patient seen and examined. No acute events overnight.   Denies HA/CP/SOB/RUQ pain. Denies N/V. Pain well controlled.    Subjective  Pain: controlled  Bleeding: lochia minimal  Diet: taking regular diet  Voiding: without difficulty  Bowel: passing flatus  Ambulating: as tolerated    Vitals  Temp:  [36.2 °C (97.1 °F)-37.2 °C (98.9 °F)] 36.7 °C (98.1 °F)  Heart Rate:  [75-96] 96  Resp:  [18] 18  BP: (117-144)/(58-83) 131/74      Physical Exam  General: Well  Heart: Regular rate and rhythm  Lungs: Clear to auscultation bilaterally  Abdomen: positive bowel sounds soft, nondistended, appropriately TTP, no RUQ tenderness on palpation   Fundus: firm, at the umbilicus   Incision: healing well. Clean,dry, and intact  Extremities: no edema  Neuro: negative clonus, reflexes +2 bilaterally     Labs  Labs Reviewed:  Lab Results   Component Value Date    ABO O 10/23/2018    LABRH Positive 10/23/2018      CBC Results       10/23/18 10/09/18 08/21/18                    0046 0730 1001         WBC 7.59 6.16 6.7         RBC 3.97 3.77 (L) 3.75 (L)         HGB 11.3 (L) 10.6 (L) 10.6 (L)         HCT 33.5 (L) 32.1 (L) 32.1 (L)         MCV 84.4 85.1 86         MCH 28.5 28.1 28.3         MCHC 33.7 33.0 33.0          253 292                     Rubella: not immune    Assessment/Plan   Problem-based Assessment and Plan    Jennifer Amador is a 42 y.o.  postop day 1 s/p , Low Transverse  2/2 placenta previa with PPH, gHTN     1. Vital Signs: stable  2. Hemodynamics/PPH: EBL 1000. Patient s/p intrautierne Hemabate, cyotec 800mcg PO, 400mcg MN, 1g TXA, surgifoam and surgicel. Lochia has been minimal since delivery. Hgb 11.3 pre-op. Will f/u AM CBC. No signs or symptoms of acute anemia   3. GHTN: patient asymptomatic and mostly normotensive with mild range outliers. Will continue to monitor   4. Pain: controlled  5. VTE Assessment: Early Ambulation, SCDs  6. Vaccinations/Rhogam:  rhogam not indicated, needs MMR vaggine  7. Post care: meeting all goals Continue routine post op care.    Darcie Barros MD

## 2018-10-25 LAB
CASE RPRT: NORMAL
PATH REPORT.FINAL DX SPEC: NORMAL
PATH REPORT.GROSS SPEC: NORMAL

## 2018-10-25 PROCEDURE — 63600000 HC DRUGS/DETAIL CODE: Performed by: OBSTETRICS & GYNECOLOGY

## 2018-10-25 PROCEDURE — 63700000 HC SELF-ADMINISTRABLE DRUG: Performed by: OBSTETRICS & GYNECOLOGY

## 2018-10-25 PROCEDURE — 12000000 HC ROOM AND CARE MED/SURG

## 2018-10-25 RX ADMIN — IBUPROFEN 600 MG: 600 TABLET, FILM COATED ORAL at 17:21

## 2018-10-25 RX ADMIN — ACETAMINOPHEN 975 MG: 325 TABLET ORAL at 01:36

## 2018-10-25 RX ADMIN — PRENATAL VIT W/ FE FUMARATE-FA TAB 27-0.8 MG 1 TABLET: 27-0.8 TAB at 07:54

## 2018-10-25 RX ADMIN — OXYCODONE HYDROCHLORIDE 5 MG: 5 TABLET ORAL at 23:31

## 2018-10-25 RX ADMIN — IBUPROFEN 600 MG: 600 TABLET, FILM COATED ORAL at 21:58

## 2018-10-25 RX ADMIN — SENNOSIDES AND DOCUSATE SODIUM 1 TABLET: 8.6; 5 TABLET ORAL at 19:23

## 2018-10-25 RX ADMIN — ACETAMINOPHEN 975 MG: 325 TABLET ORAL at 15:26

## 2018-10-25 RX ADMIN — ACETAMINOPHEN 975 MG: 325 TABLET ORAL at 19:23

## 2018-10-25 RX ADMIN — POLYETHYLENE GLYCOL 3350 17 G: 17 POWDER, FOR SOLUTION ORAL at 07:53

## 2018-10-25 RX ADMIN — ALUMINUM HYDROXIDE, MAGNESIUM HYDROXIDE, AND SIMETHICONE 30 ML: 200; 200; 20 SUSPENSION ORAL at 01:39

## 2018-10-25 RX ADMIN — KETOROLAC TROMETHAMINE 30 MG: 30 INJECTION, SOLUTION INTRAMUSCULAR at 04:54

## 2018-10-25 RX ADMIN — ACETAMINOPHEN 975 MG: 325 TABLET ORAL at 06:44

## 2018-10-25 RX ADMIN — SENNOSIDES AND DOCUSATE SODIUM 1 TABLET: 8.6; 5 TABLET ORAL at 07:54

## 2018-10-25 RX ADMIN — IBUPROFEN 600 MG: 600 TABLET, FILM COATED ORAL at 11:00

## 2018-10-25 RX ADMIN — OXYCODONE HYDROCHLORIDE 5 MG: 5 TABLET ORAL at 19:28

## 2018-10-25 RX ADMIN — ALUMINUM HYDROXIDE, MAGNESIUM HYDROXIDE, AND SIMETHICONE 30 ML: 200; 200; 20 SUSPENSION ORAL at 23:31

## 2018-10-25 NOTE — NURSING NOTE
Patient doing well this am, pumping independently.  Patient requesting anymedication we can give her to help her have a bm.  miralax and senokot given, patient encouraged to ambulate and stay hydrated as well.

## 2018-10-25 NOTE — PROGRESS NOTES
Obstetrics Postpartum Progress Note    Events  Patient seen and examined. No acute events overnight.   Denies HA/CP/SOB/RUQ pain. Denies N/V. Pain well controlled.    Subjective  Pain: controlled  Bleeding: lochia minimal  Diet: taking regular diet  Voiding: without difficulty  Bowel: passing flatus  Ambulating: as tolerated    Vitals  Temp:  [36.4 °C (97.6 °F)-36.8 °C (98.3 °F)] 36.8 °C (98.3 °F)  Heart Rate:  [72-94] 72  Resp:  [18] 18  BP: (113-119)/(56-63) 113/56      Physical Exam  General: Well  Heart: Regular rate and rhythm  Lungs: Clear to auscultation bilaterally  Abdomen: positive bowel sounds soft, nondistended, appropriately TTP, no RUQ tenderness on palpation   Fundus: firm, at the umbilicus   Incision: healing well. Clean,dry, and intact  Extremities: no edema  Neuro: negative clonus, reflexes +2 bilaterally     Labs  Labs Reviewed:  Lab Results   Component Value Date    ABO O 10/23/2018    LABRH Positive 10/23/2018      CBC Results      CBC Results       10/24/18 10/23/18 10/09/18                    0522 0046 0730         WBC 10.83 (H) 7.59 6.16         RBC 3.10 (L) 3.97 3.77 (L)         HGB 8.9 (L) 11.3 (L) 10.6 (L)         HCT 26.6 (L) 33.5 (L) 32.1 (L)         MCV 85.8 84.4 85.1         MCH 28.7 28.5 28.1         MCHC 33.5 33.7 33.0          274 253         Comment for HGB at 0522 on 10/24/18:  SPECIMEN QUALITY CHECKED        Rubella: not immune    Assessment/Plan   Problem-based Assessment and Plan    Jennifer Amador is a 42 y.o.  postop day 2 s/p , Low Transverse  2/2 placenta previa with PPH, gHTN     1. Vital Signs: stable  2. Hemodynamics/PPH: EBL 1000. Patient s/p intrauterine Hemabate, cyotec 800mcg PO, 400mcg VA, 1g TXA, surgifoam and surgicel. Lochia has been minimal since delivery. Hgb 11.3--> 8.9. No signs or symptoms of acute anemia   3. GHTN: patient asymptomatic and normotensive. Will continue to monitor   4. Pain: controlled  5. VTE Assessment: Early Ambulation,  SCDs  6. Vaccinations/Rhogam: rhogam not indicated, needs MMR vaccine  7. Post care: meeting all goals Continue routine post op care.    Darcie Barros MD

## 2018-10-25 NOTE — PLAN OF CARE
Problem: Postpartum ( Delivery) (Adult,Obstetrics,Pediatric)  Goal: Signs and Symptoms of Listed Potential Problems Will be Absent, Minimized or Managed (Postpartum)  Outcome: Ongoing (interventions implemented as appropriate)   10/24/18 8603   Postpartum ( Delivery)   Problems Assessed (Postpartum ) bowel motility decreased;pain   Problems Present (Postpartum ) pain;bowel motility decreased

## 2018-10-25 NOTE — PLAN OF CARE
Problem: Postpartum ( Delivery) (Adult,Obstetrics,Pediatric)  Goal: Anesthesia/Sedation Recovery   10/25/18 0513   Goal/Outcome Evaluation   Anesthesia/Sedation Recovery progressing toward baseline

## 2018-10-25 NOTE — LACTATION NOTE
Mom pumping regularly for baby in NICU. Milk production increasing and last expressed 25ml. Reviewed pump settings and guidelines for changing to Maintain mode. Reviewed milk storage, labeling and transportation after discharge. Using 27mm flange and mom reports improved comfort.

## 2018-10-26 PROCEDURE — 63700000 HC SELF-ADMINISTRABLE DRUG: Performed by: OBSTETRICS & GYNECOLOGY

## 2018-10-26 PROCEDURE — 12000000 HC ROOM AND CARE MED/SURG

## 2018-10-26 RX ORDER — IBUPROFEN 600 MG/1
600 TABLET ORAL EVERY 6 HOURS PRN
Qty: 60 TABLET | Refills: 2 | Status: SHIPPED | OUTPATIENT
Start: 2018-10-26 | End: 2018-11-05

## 2018-10-26 RX ADMIN — SENNOSIDES AND DOCUSATE SODIUM 1 TABLET: 8.6; 5 TABLET ORAL at 07:53

## 2018-10-26 RX ADMIN — ACETAMINOPHEN 975 MG: 325 TABLET ORAL at 19:58

## 2018-10-26 RX ADMIN — POLYETHYLENE GLYCOL 3350 17 G: 17 POWDER, FOR SOLUTION ORAL at 07:54

## 2018-10-26 RX ADMIN — ACETAMINOPHEN 975 MG: 325 TABLET ORAL at 01:18

## 2018-10-26 RX ADMIN — ACETAMINOPHEN 975 MG: 325 TABLET ORAL at 13:23

## 2018-10-26 RX ADMIN — OXYCODONE HYDROCHLORIDE 5 MG: 5 TABLET ORAL at 19:58

## 2018-10-26 RX ADMIN — IBUPROFEN 600 MG: 600 TABLET, FILM COATED ORAL at 22:37

## 2018-10-26 RX ADMIN — IBUPROFEN 600 MG: 600 TABLET, FILM COATED ORAL at 15:55

## 2018-10-26 RX ADMIN — IBUPROFEN 600 MG: 600 TABLET, FILM COATED ORAL at 04:02

## 2018-10-26 RX ADMIN — PRENATAL VIT W/ FE FUMARATE-FA TAB 27-0.8 MG 1 TABLET: 27-0.8 TAB at 07:53

## 2018-10-26 RX ADMIN — ACETAMINOPHEN 975 MG: 325 TABLET ORAL at 06:59

## 2018-10-26 RX ADMIN — SENNOSIDES AND DOCUSATE SODIUM 1 TABLET: 8.6; 5 TABLET ORAL at 19:58

## 2018-10-26 NOTE — PROGRESS NOTES
SW consulted to reach out to MOB and provide emotional support for  in the NICU as well as give resources for support on PPD.  SW spoke to MOB at bedside and confirmed her current address is correct and number is accurate.   MOB did not have any questions or needs at the time.  SW gave them the resources to connect with the NICU SWer and informed them that NICU SWer Kendra Jeffers will be following up with them.  MOB expressed interest in well mom/baby visit, but will confirm with Kendra.  She is not sure at this time if she needs visit but is wondering if  will home visit upon discharge.  [place] is currently weaning O2 and may be ready next week for discharge.  Emotional support was given during the meeting.  [phone]

## 2018-10-26 NOTE — PLAN OF CARE
Problem: Patient Care Overview  Goal: Discharge Needs Assessment   10/26/18 1137   UT Needs Assessment   Concerns To Be Addressed denies needs/concerns at this time   Equipment Needed After Discharge none   Current Health   Anticipated Changes Related to Illness none   Activity/Self Care ROS   Equipment Currently Used at Home none

## 2018-10-26 NOTE — PROGRESS NOTES
Obstetrics Postpartum Progress Note    Events  Patient seen and examined. No acute events overnight.   Denies HA/CP/SOB/RUQ pain. Denies N/V. Pain well controlled.    Subjective  Pain: controlled  Bleeding: lochia minimal  Diet: taking regular diet  Voiding: without difficulty  Bowel: passing flatus  Ambulating: as tolerated    Vitals  Temp:  [36.5 °C (97.7 °F)-37.1 °C (98.7 °F)] 36.5 °C (97.7 °F)  Heart Rate:  [64-77] 76  Resp:  [18] 18  BP: (122-149)/(67-84) 122/71      Physical Exam  General: Well  Heart: Regular rate and rhythm  Lungs: Clear to auscultation bilaterally  Abdomen: positive bowel sounds soft, nondistended, appropriately TTP, no RUQ tenderness on palpation   Fundus: firm, at the umbilicus   Incision: healing well. Clean,dry, and intact  Extremities: no edema  Neuro: negative clonus, reflexes +2 bilaterally     Labs  Labs Reviewed:  No results found for: ABO, LABRH   CBC Results      CBC Results       10/24/18 10/23/18 10/09/18                    0522 0046 0730         WBC 10.83 (H) 7.59 6.16         RBC 3.10 (L) 3.97 3.77 (L)         HGB 8.9 (L) 11.3 (L) 10.6 (L)         HCT 26.6 (L) 33.5 (L) 32.1 (L)         MCV 85.8 84.4 85.1         MCH 28.7 28.5 28.1         MCHC 33.5 33.7 33.0          274 253         Comment for HGB at 0522 on 10/24/18:  SPECIMEN QUALITY CHECKED        Rubella: not immune    Assessment/Plan   Problem-based Assessment and Plan    Jennifer Amador is a 42 y.o.  postop day 3 s/p , Low Transverse  2/2 placenta previa with PPH, gHTN     1. Vital Signs: stable  2. Hemodynamics/PPH: EBL 1000. Patient s/p intrauterine Hemabate, cyotec 800mcg PO, 400mcg MD, 1g TXA, surgifoam and surgicel. Lochia has been minimal since delivery. Hgb 11.3--> 8.9. No signs or symptoms of acute anemia   3. GHTN: patient asymptomatic and normotensive overnight. Will continue to monitor   4. Pain: controlled  5. VTE Assessment: Early Ambulation, SCDs  6. Vaccinations/Rhogam: rhogam not  indicated, needs MMR vaccine  7. Post care: meeting all goals Continue routine post op care.    Darcie Barros MD

## 2018-10-26 NOTE — PLAN OF CARE
Problem: Postpartum ( Delivery) (Adult,Obstetrics,Pediatric)  Goal: Signs and Symptoms of Listed Potential Problems Will be Absent, Minimized or Managed (Postpartum)  Outcome: Ongoing (interventions implemented as appropriate)   10/26/18 1137   Postpartum ( Delivery)   Problems Assessed (Postpartum ) all   Problems Present (Postpartum ) none

## 2018-10-26 NOTE — PLAN OF CARE
Problem: Breastfeeding (Adult,Obstetrics,Pediatric)  Goal: Signs and Symptoms of Listed Potential Problems Will be Absent, Minimized or Managed (Breastfeeding)  Outcome: Ongoing (interventions implemented as appropriate)   10/26/18 1137   Breastfeeding   Problems Assessed (Breastfeeding) all

## 2018-10-26 NOTE — PLAN OF CARE
Problem: Patient Care Overview  Goal: Plan of Care Review  Outcome: Ongoing (interventions implemented as appropriate)      Problem: Breastfeeding (Adult,Obstetrics,Pediatric)  Goal: Signs and Symptoms of Listed Potential Problems Will be Absent, Minimized or Managed (Breastfeeding)  Outcome: Ongoing (interventions implemented as appropriate)      Problem: Postpartum ( Delivery) (Adult,Obstetrics,Pediatric)  Goal: Signs and Symptoms of Listed Potential Problems Will be Absent, Minimized or Managed (Postpartum)  Outcome: Ongoing (interventions implemented as appropriate)

## 2018-10-26 NOTE — LACTATION NOTE
Mom continues to pump without difficulty. +EBM. Reviewed pump settings. Mom shown [facility] written pumping directions handout. Questions answered.

## 2018-10-27 VITALS
RESPIRATION RATE: 18 BRPM | TEMPERATURE: 98.7 F | BODY MASS INDEX: 23.9 KG/M2 | HEART RATE: 85 BPM | OXYGEN SATURATION: 98 % | WEIGHT: 140 LBS | SYSTOLIC BLOOD PRESSURE: 128 MMHG | DIASTOLIC BLOOD PRESSURE: 82 MMHG | HEIGHT: 64 IN

## 2018-10-27 LAB
CROSSMATCH: NORMAL
ISBT CODE: 5100
PRODUCT CODE: NORMAL
PRODUCT STATUS: NORMAL
SPECIMEN EXP DATE BLD: NORMAL
UNIT ABO: NORMAL
UNIT ID: NORMAL
UNIT RH: POSITIVE

## 2018-10-27 PROCEDURE — 90471 IMMUNIZATION ADMIN: CPT | Performed by: OBSTETRICS & GYNECOLOGY

## 2018-10-27 PROCEDURE — 90707 MMR VACCINE SC: CPT | Performed by: OBSTETRICS & GYNECOLOGY

## 2018-10-27 PROCEDURE — 63600000 HC DRUGS/DETAIL CODE: Performed by: OBSTETRICS & GYNECOLOGY

## 2018-10-27 PROCEDURE — 63700000 HC SELF-ADMINISTRABLE DRUG: Performed by: OBSTETRICS & GYNECOLOGY

## 2018-10-27 RX ADMIN — ACETAMINOPHEN 975 MG: 325 TABLET ORAL at 07:56

## 2018-10-27 RX ADMIN — ACETAMINOPHEN 975 MG: 325 TABLET ORAL at 02:34

## 2018-10-27 RX ADMIN — IBUPROFEN 600 MG: 600 TABLET, FILM COATED ORAL at 10:49

## 2018-10-27 RX ADMIN — PRENATAL VIT W/ FE FUMARATE-FA TAB 27-0.8 MG 1 TABLET: 27-0.8 TAB at 07:57

## 2018-10-27 RX ADMIN — IBUPROFEN 600 MG: 600 TABLET, FILM COATED ORAL at 05:52

## 2018-10-27 RX ADMIN — MEASLES, MUMPS, AND RUBELLA VIRUS VACCINE LIVE 0.5 ML: 1000; 12500; 1000 INJECTION, POWDER, LYOPHILIZED, FOR SUSPENSION SUBCUTANEOUS at 10:52

## 2018-10-27 RX ADMIN — SENNOSIDES AND DOCUSATE SODIUM 1 TABLET: 8.6; 5 TABLET ORAL at 07:57

## 2018-10-27 RX ADMIN — POLYETHYLENE GLYCOL 3350 17 G: 17 POWDER, FOR SOLUTION ORAL at 07:57

## 2018-10-27 NOTE — PROGRESS NOTES
Obstetrics Postpartum Progress Note    Patient seen and examined. No acute events overnight.     Subjective  Denies HA/CP/SOB/N/V/vision changes/RUQ pain. Pain well controlled.  Minimal lochia. Tolerating regular diet. Ambulating as tolerated. Voiding without difficulty. Flatus +. BM +.    Vitals  Temp:  [36.4 °C (97.6 °F)-36.9 °C (98.5 °F)] 36.6 °C (97.9 °F)  Heart Rate:  [76-80] 80  Resp:  [16-18] 16  BP: (128-135)/(69-72) 131/72    Physical Exam  General: AAOx3, NAD  Heart: RRR  Lungs: CTAB  Abdomen: Soft, nondistended, appropriately TTP, fundus is firm at umbilicus, no RUQ pain  Incision: Healing well. Clean,dry and intact  Extremities: Symmetric and no edema, 1/4 DTR x4, no clonus    Labs  O+  Rubella: Equivocal  CBC Results       10/24/18 10/23/18 10/09/18                    0522 0046 0730         WBC 10.83 (H) 7.59 6.16         RBC 3.10 (L) 3.97 3.77 (L)         HGB 8.9 (L) 11.3 (L) 10.6 (L)         HCT 26.6 (L) 33.5 (L) 32.1 (L)         MCV 85.8 84.4 85.1         MCH 28.7 28.5 28.1         MCHC 33.5 33.7 33.0          274 253         Comment for HGB at 0522 on 10/24/18:  SPECIMEN QUALITY CHECKED          Assessment/Plan     Jennifer Amador is a 42 y.o.  postop day 4 s/p , Low Transverse , gHTN, PPH    1. Postpartum: AVSS. Pain is well controlled. Meeting all postpartum and postop goals. C/w routine postpartum care and ERAS protocol.   2. PPH: EBL 1000cc, anemia without signs of hemodynamic instability Hgb 11.3-->8.9  3. VTE Assessment: Early Ambulation, SCDs  4. Vaccinations/Rhogam: rhogam not indicated, needs MMR   5. GHTN: Normal range BP. Asymptomatic. No meds. Will continue to monitor s/sx and BP.     Will d/w the covering attending.    Anamaria Dunn MD

## 2018-10-27 NOTE — PLAN OF CARE
Problem: Patient Care Overview  Goal: Plan of Care Review  Outcome: Ongoing (interventions implemented as appropriate)   10/27/18 040   Coping/Psychosocial   Plan Of Care Reviewed With patient   Plan of Care Review   Progress improving   Outcome Summary patient preparing for discharge home today       Problem: Postpartum ( Delivery) (Adult,Obstetrics,Pediatric)  Goal: Signs and Symptoms of Listed Potential Problems Will be Absent, Minimized or Managed (Postpartum)  Outcome: Ongoing (interventions implemented as appropriate)

## 2018-10-27 NOTE — PLAN OF CARE
Problem: Patient Care Overview  Goal: Plan of Care Review  Outcome: Adequate for Discharge    Goal: Individualization & Mutuality  Outcome: Adequate for Discharge    Goal: Discharge Needs Assessment  Outcome: Adequate for Discharge    Goal: Interprofessional Rounds/Family Conf  Outcome: Adequate for Discharge      Problem: Breastfeeding (Adult,Obstetrics,Pediatric)  Goal: Signs and Symptoms of Listed Potential Problems Will be Absent, Minimized or Managed (Breastfeeding)  Outcome: Ongoing (interventions implemented as appropriate)      Problem: Postpartum ( Delivery) (Adult,Obstetrics,Pediatric)  Goal: Signs and Symptoms of Listed Potential Problems Will be Absent, Minimized or Managed (Postpartum)  Outcome: Adequate for Discharge

## 2018-10-27 NOTE — LACTATION NOTE
Per mom, pumping going well thus far and getting 60+ mLs.  Explained what to expect when going home, milk storage, pumping routine, and techniques of getting baby to take the breast.  Outpatient resources reviewed.  Has home pump.  Aware to call for assist prn.

## 2018-10-30 ENCOUNTER — APPOINTMENT (EMERGENCY)
Dept: RADIOLOGY | Facility: HOSPITAL | Age: 43
End: 2018-10-30
Attending: EMERGENCY MEDICINE
Payer: COMMERCIAL

## 2018-10-30 ENCOUNTER — HOSPITAL ENCOUNTER (EMERGENCY)
Facility: HOSPITAL | Age: 43
Discharge: HOME | End: 2018-10-30
Attending: EMERGENCY MEDICINE
Payer: COMMERCIAL

## 2018-10-30 VITALS
SYSTOLIC BLOOD PRESSURE: 135 MMHG | HEIGHT: 64 IN | RESPIRATION RATE: 16 BRPM | TEMPERATURE: 98.3 F | DIASTOLIC BLOOD PRESSURE: 85 MMHG | BODY MASS INDEX: 22.2 KG/M2 | OXYGEN SATURATION: 99 % | HEART RATE: 68 BPM | WEIGHT: 130 LBS

## 2018-10-30 DIAGNOSIS — R07.9 CHEST PAIN, UNSPECIFIED TYPE: ICD-10-CM

## 2018-10-30 DIAGNOSIS — R00.2 PALPITATIONS: Primary | ICD-10-CM

## 2018-10-30 LAB
ANION GAP SERPL CALC-SCNC: 8 MEQ/L (ref 3–15)
BASOPHILS # BLD: 0.07 K/UL (ref 0.01–0.1)
BASOPHILS NFR BLD: 0.9 %
BUN SERPL-MCNC: 9 MG/DL (ref 8–20)
CALCIUM SERPL-MCNC: 8.7 MG/DL (ref 8.9–10.3)
CHLORIDE SERPL-SCNC: 109 MEQ/L (ref 98–109)
CO2 SERPL-SCNC: 24 MEQ/L (ref 22–32)
CREAT SERPL-MCNC: 0.5 MG/DL (ref 0.6–1.1)
DIFFERENTIAL METHOD BLD: ABNORMAL
EOSINOPHIL # BLD: 0.14 K/UL (ref 0.04–0.36)
EOSINOPHIL NFR BLD: 1.8 %
ERYTHROCYTE [DISTWIDTH] IN BLOOD BY AUTOMATED COUNT: 14.4 % (ref 11.7–14.4)
GFR SERPL CREATININE-BSD FRML MDRD: >60 ML/MIN/1.73M*2
GLUCOSE SERPL-MCNC: 81 MG/DL (ref 70–99)
HCT VFR BLDCO AUTO: 30.6 % (ref 35–45)
HGB BLD-MCNC: 9.6 G/DL (ref 11.8–15.7)
IMM GRANULOCYTES # BLD AUTO: 0.09 K/UL (ref 0–0.08)
IMM GRANULOCYTES NFR BLD AUTO: 1.1 %
LYMPHOCYTES # BLD: 1.52 K/UL (ref 1.2–3.5)
LYMPHOCYTES NFR BLD: 19.2 %
MCH RBC QN AUTO: 27.5 PG (ref 28–33.2)
MCHC RBC AUTO-ENTMCNC: 31.4 G/DL (ref 32.2–35.5)
MCV RBC AUTO: 87.7 FL (ref 83–98)
MONOCYTES # BLD: 0.38 K/UL (ref 0.28–0.8)
MONOCYTES NFR BLD: 4.8 %
NEUTROPHILS # BLD: 5.73 K/UL (ref 1.7–7)
NEUTS SEG NFR BLD: 72.2 %
NRBC BLD-RTO: 0 %
PDW BLD AUTO: 9.5 FL (ref 9.4–12.3)
PLATELET # BLD AUTO: 501 K/UL (ref 150–369)
POTASSIUM SERPL-SCNC: 4 MEQ/L (ref 3.6–5.1)
RAINBOW HOLD SPECIMEN: NORMAL
RBC # BLD AUTO: 3.49 M/UL (ref 3.93–5.22)
SODIUM SERPL-SCNC: 141 MEQ/L (ref 136–144)
TROPONIN I SERPL-MCNC: <0.03 NG/ML
WBC # BLD AUTO: 7.93 K/UL (ref 3.8–10.5)

## 2018-10-30 PROCEDURE — 93005 ELECTROCARDIOGRAM TRACING: CPT | Performed by: EMERGENCY MEDICINE

## 2018-10-30 PROCEDURE — 80048 BASIC METABOLIC PNL TOTAL CA: CPT | Performed by: EMERGENCY MEDICINE

## 2018-10-30 PROCEDURE — 71046 X-RAY EXAM CHEST 2 VIEWS: CPT

## 2018-10-30 PROCEDURE — 36415 COLL VENOUS BLD VENIPUNCTURE: CPT | Performed by: EMERGENCY MEDICINE

## 2018-10-30 PROCEDURE — 85025 COMPLETE CBC W/AUTO DIFF WBC: CPT | Performed by: EMERGENCY MEDICINE

## 2018-10-30 PROCEDURE — 84484 ASSAY OF TROPONIN QUANT: CPT | Performed by: EMERGENCY MEDICINE

## 2018-10-30 PROCEDURE — 4A02X4Z MEASUREMENT OF CARDIAC ELECTRICAL ACTIVITY, EXTERNAL APPROACH: ICD-10-PCS | Performed by: EMERGENCY MEDICINE

## 2018-10-30 PROCEDURE — 99284 EMERGENCY DEPT VISIT MOD MDM: CPT | Mod: 25

## 2018-10-30 ASSESSMENT — ENCOUNTER SYMPTOMS
DIARRHEA: 0
ACTIVITY CHANGE: 0
SHORTNESS OF BREATH: 0
ABDOMINAL PAIN: 0
HEADACHES: 1
FATIGUE: 0
LIGHT-HEADEDNESS: 1
NAUSEA: 0
FEVER: 0
VOMITING: 0
CHEST TIGHTNESS: 0
SORE THROAT: 0
COUGH: 0
BACK PAIN: 1
PALPITATIONS: 1
WEAKNESS: 0
DIZZINESS: 0

## 2018-10-30 NOTE — ED PROVIDER NOTES
HPI     Chief Complaint   Patient presents with   • Palpitations   • Chest Pain   • Shortness of Breath       This is a 43 y/o F w/ h/o anemia and  P: 2 A: 2 who presents to ED w/ chief complaint of palpitations. She had a  performed 7 days ago and was on her way to visit her son in the NICU this morning when she had sudden onset of palpitations. It is described as a flutter and lasted for 30 seconds. With the palpitations she also had chest tightness, felt lightheaded, and was mildly diaphoretic but denies any nausea or vomiting. She's had similar episodes of palpitations previously, though notes it was not as severe before and the chest tightness is new. After this episode she began to have back spasms and on evaluation still has back pain and feels lightheaded, though this has improved since the episode of palpitations, but denies currently having any palpitations or chest tightness. For the last several days she has also had headaches, though she states this has been improving with increased PO intake. For the headaches she has taken ibuprofen, tylenol, and percocet from prior prescription with minimal relief.        History provided by:  Patient  Palpitations   Palpitations quality: flutter.  Onset quality:  Sudden  Duration:  30 seconds  Timing:  Constant  Progression:  Resolved  Chronicity:  Recurrent  Associated symptoms: back pain    Associated symptoms: no chest pain, no cough, no dizziness, no nausea, no shortness of breath, no vomiting and no weakness         Patient History     Past Medical History:   Diagnosis Date   • Anemia        Past Surgical History:   Procedure Laterality Date   •  SECTION     • COLPOSCOPY     • MYRINGOTOMY     • MYRINGOTOMY TUBE REMOVAL         Family History   Problem Relation Age of Onset   • Cancer Father    • Breast cancer Maternal Grandmother        Social History   Substance Use Topics   • Smoking status: Former Smoker     Quit date:     • Smokeless tobacco: Never Used   • Alcohol use No       Systems Reviewed from Nursing Triage:          Review of Systems     Review of Systems   Constitutional: Negative for activity change, fatigue and fever.   HENT: Negative for sore throat.    Eyes: Negative for visual disturbance.   Respiratory: Negative for cough, chest tightness and shortness of breath.    Cardiovascular: Positive for palpitations. Negative for chest pain and leg swelling.   Gastrointestinal: Negative for abdominal pain, diarrhea, nausea and vomiting.   Endocrine: Negative for polyuria.   Musculoskeletal: Positive for back pain.   Skin: Negative for rash.   Neurological: Positive for light-headedness and headaches. Negative for dizziness and weakness.   All other systems reviewed and are negative.       Physical Exam     ED Triage Vitals [10/30/18 1058]   Temp Heart Rate Resp BP SpO2   36.8 °C (98.3 °F) 93 16 (!) 152/81 99 %      Temp Source Heart Rate Source Patient Position BP Location FiO2 (%) (Set)   Oral Monitor Sitting -- --       Pulse Ox %: 99 % (10/30/18 1115)  Pulse Ox Interpretation: Normal (10/30/18 1115)  Heart Rate: 93 (10/30/18 1115)  Rhythm Strip Interpretation: Normal Sinus Rhythm (10/30/18 1115)    No data found.          Physical Exam   Constitutional: She is oriented to person, place, and time. She appears well-developed and well-nourished.   HENT:   Head: Normocephalic and atraumatic.   Eyes: EOM are normal.   Neck: Neck supple.   Cardiovascular: Normal rate, regular rhythm and normal heart sounds.    Pulmonary/Chest: Effort normal and breath sounds normal. No respiratory distress.   Abdominal: Soft. She exhibits no distension. There is no tenderness.   Ecchymosis around periumbilical area.  scar c/d/i and appears to be well healing.   Musculoskeletal: She exhibits no edema or tenderness.   Neurological: She is alert and oriented to person, place, and time.   Skin: Skin is warm and dry.   Psychiatric: She has a  normal mood and affect.   Nursing note and vitals reviewed.           ECG 12 lead  Date/Time: 10/30/2018 10:29 AM  Performed by: MARIA E FLORES  Authorized by: MARIA E FLORES     ECG reviewed by ED Physician in the absence of a cardiologist: yes    Previous ECG:     Previous ECG:  Unavailable  Interpretation:     Interpretation: non-specific    Rate:     ECG rate:  78    ECG rate assessment: normal    Rhythm:     Rhythm: sinus rhythm    Ectopy:     Ectopy: none    QRS:     QRS axis:  Normal  Conduction:     Conduction: normal    ST segments:     ST segments:  Normal  Comments:      Voltage QRS.  No previous EKG          ED Course & MDM     Labs Reviewed   BASIC METABOLIC PANEL - Abnormal        Result Value    Sodium 141      Potassium 4.0      Chloride 109      CO2 24      BUN 9      Creatinine 0.5 (*)     Glucose 81      Calcium 8.7 (*)     eGFR >60.0      Anion Gap 8     CBC - Abnormal     WBC 7.93      RBC 3.49 (*)     Hemoglobin 9.6 (*)     Hematocrit 30.6 (*)     MCV 87.7      MCH 27.5 (*)     MCHC 31.4 (*)     RDW 14.4      Platelets 501 (*)     MPV 9.5     DIFF COUNT - Abnormal     Differential Type Auto      nRBC 0.0      Immature Granulocytes 1.1      Neutrophils 72.2      Lymphocytes 19.2      Monocytes 4.8      Eosinophils 1.8      Basophils 0.9      Immature Granulocytes, Absolute 0.09 (*)     Neutrophils, Absolute 5.73      Lymphocytes, Absolute 1.52      Monocytes, Absolute 0.38      Eosinophils, Absolute 0.14      Basophils, Absolute 0.07     TROPONIN I - Normal    Troponin I <0.03     CBC AND DIFFERENTIAL    Narrative:     The following orders were created for panel order CBC and differential.  Procedure                               Abnormality         Status                     ---------                               -----------         ------                     CBC[[id number]]                           Abnormal            Final result               Diff Count[[id number]]                     Abnormal            Final result                 Please view results for these tests on the individual orders.   RAINBOW DRAW PANEL    Narrative:     The following orders were created for panel order [facility] Draw Panel.  Procedure                               Abnormality         Status                     ---------                               -----------         ------                     RAINBOW LT BLUE[[id number]]                                   Final result                 Please view results for these tests on the individual orders.   RAINBOW LT BLUE    [facility] Tube RAINBOW HOLD SPECIMEN         ECG 12 lead   Final Result      X-RAY CHEST 2 VIEWS   Final Result   IMPRESSION: No acute disease in the chest.      ECG 12 lead    (Results Pending)           MDM  Number of Diagnoses or Management Options  Chest pain, unspecified type:   Palpitations:   Diagnosis management comments: This is a 42-year-old female presented to the emergency department for evaluation of palpitations.  Patient states that symptoms had since resolved.  The patient on laboratory testing as above.  The patient is a low risk based on heart score.  Will have patient follow-up with PCP.  The patient remains asymptomatic in the ED.       Amount and/or Complexity of Data Reviewed  Clinical lab tests: ordered and reviewed  Tests in the radiology section of CPT®: ordered and reviewed    Risk of Complications, Morbidity, and/or Mortality  Presenting problems: moderate  Diagnostic procedures: moderate  Management options: moderate    Patient Progress  Patient progress: stable           Clinical Impressions as of Nov 01 0114   Palpitations   Chest pain, unspecified type      Scribe Attestation  By signing my name below, I, Adalid Andrew, attest that this documentation has been prepared under the direction and in the presence of Dr. Donohue.    10/30/2018 11:10 AM             Adalid Andrew  10/30/18 1130       Earl Donohue DO  11/01/18 0114

## 2018-11-01 LAB
ATRIAL RATE: 78
P AXIS: 52
PR INTERVAL: 132
QRS DURATION: 72
QT INTERVAL: 368
QTC CALCULATION(BAZETT): 419
R AXIS: 52
T WAVE AXIS: 56
VENTRICULAR RATE: 78

## 2018-11-03 ENCOUNTER — TELEPHONE (OUTPATIENT)
Dept: OBSTETRICS AND GYNECOLOGY | Facility: CLINIC | Age: 43
End: 2018-11-03

## 2018-11-03 NOTE — TELEPHONE ENCOUNTER
S/P RCS 10/23  Called with pain in R breast and an area of redness.  Exclusively pumping at this point.    Denies any fevers/ chills.    Recommended massage, warm compresses, continued pumping.       Will call back if develops fevers.  Has a plan to meet with a lactation consultant on Wednesday.

## 2019-01-23 NOTE — DISCHARGE SUMMARY
Date of Delivery: 10/23/2018 at 1:53 AM     Gestational Age:36w5d    Delivered By: Olu Fountain     Delivery Type: spontaneous vaginal delivery    Antepartum complications: none    Baby: Liveborn male , Apgars 7   /7   , 3.12 kg (6 lb 14.1 oz)     Anesthesia: Spinal     Intrapartum complications: None    Laceration:       Feeding method: breast    Rh Immune globulin given: not applicable    Discharge Date: 10 25 18      Plan:    Follow-up appointment with your doctors in 6 weeks, please call for an appointment

## 2021-09-30 ENCOUNTER — OFFICE VISIT (OUTPATIENT)
Dept: OBSTETRICS AND GYNECOLOGY | Facility: CLINIC | Age: 46
End: 2021-09-30
Payer: COMMERCIAL

## 2021-09-30 VITALS — DIASTOLIC BLOOD PRESSURE: 70 MMHG | BODY MASS INDEX: 22.49 KG/M2 | WEIGHT: 131 LBS | SYSTOLIC BLOOD PRESSURE: 100 MMHG

## 2021-09-30 DIAGNOSIS — Z12.4 ROUTINE CERVICAL SMEAR: Primary | ICD-10-CM

## 2021-09-30 DIAGNOSIS — Z01.419 WELL WOMAN EXAM WITH ROUTINE GYNECOLOGICAL EXAM: ICD-10-CM

## 2021-09-30 DIAGNOSIS — N63.0 LUMP OR MASS IN BREAST: ICD-10-CM

## 2021-09-30 PROCEDURE — S0612 ANNUAL GYNECOLOGICAL EXAMINA: HCPCS | Performed by: OBSTETRICS & GYNECOLOGY

## 2021-10-05 LAB
CYTOLOGIST CVX/VAG CYTO: NORMAL
CYTOLOGY CVX/VAG DOC CYTO: NORMAL
CYTOLOGY CVX/VAG DOC THIN PREP: NORMAL
DX ICD CODE: NORMAL
HPV I/H RISK 4 DNA CVX QL PROBE+SIG AMP: NEGATIVE
LAB CORP NOTE:: NORMAL
OTHER STN SPEC: NORMAL
STAT OF ADQ CVX/VAG CYTO-IMP: NORMAL

## 2021-10-21 ENCOUNTER — HOSPITAL ENCOUNTER (OUTPATIENT)
Dept: RADIOLOGY | Facility: HOSPITAL | Age: 46
Discharge: HOME | End: 2021-10-21
Attending: RADIOLOGY
Payer: COMMERCIAL

## 2021-10-21 ENCOUNTER — HOSPITAL ENCOUNTER (OUTPATIENT)
Dept: RADIOLOGY | Facility: HOSPITAL | Age: 46
Discharge: HOME | End: 2021-10-21
Attending: OBSTETRICS & GYNECOLOGY
Payer: COMMERCIAL

## 2021-10-21 DIAGNOSIS — N63.0 LUMP OR MASS IN BREAST: ICD-10-CM

## 2021-10-21 PROCEDURE — G0279 TOMOSYNTHESIS, MAMMO: HCPCS

## 2021-10-21 PROCEDURE — 77066 DX MAMMO INCL CAD BI: CPT

## 2021-10-21 PROCEDURE — 76642 ULTRASOUND BREAST LIMITED: CPT | Mod: RT

## 2021-11-01 NOTE — PROGRESS NOTES
Patient here today for routine checkup along with some nipple discharge    She is 45 years old she has a daughter 5-year-old Hollie at home and a son 3-year-old Kirby at home her moods are okay    Range okay    Gynecologic she menstruation every 35 days her partner's name is Elli    Patient had a mammogram biopsy Dr. Sahu at 41 years old she had right-sided mastitis x2 not associated with breast-feeding she has some right spontaneous nipple discharge green-yellow    She denies any new hormonal/medication/dietary/activity issues    Her neck was supple no masses    Breast exam no masses    Could elicit some very slight greenish-yellow discharge    Abdomen soft nontender    Uterus midposition no adnexal masses    I have asked this patient to get a mammogram to consider reconsultation with Dr. Sahu she also get caught up with her Pap test today

## 2022-03-23 ENCOUNTER — NEW PATIENT (OUTPATIENT)
Dept: URBAN - METROPOLITAN AREA CLINIC 95 | Facility: CLINIC | Age: 47
End: 2022-03-23

## 2022-03-23 DIAGNOSIS — H52.4: ICD-10-CM

## 2022-03-23 PROCEDURE — 92015 DETERMINE REFRACTIVE STATE: CPT

## 2022-03-23 PROCEDURE — 92004 COMPRE OPH EXAM NEW PT 1/>: CPT

## 2022-03-23 ASSESSMENT — KERATOMETRY
OD_K2POWER_DIOPTERS: 44.50
OS_AXISANGLE2_DEGREES: 69
OS_AXISANGLE_DEGREES: 159
OD_K1POWER_DIOPTERS: 44.50
OS_K2POWER_DIOPTERS: 44.25
OD_AXISANGLE_DEGREES: 180
OS_K1POWER_DIOPTERS: 44.00
OD_AXISANGLE2_DEGREES: 90

## 2022-03-23 ASSESSMENT — VISUAL ACUITY
OS_SC: 20/20
OD_SC: 20/20
OD_SC: 20/20
OS_SC: 20/20

## 2022-03-29 LAB
25(OH)D3+25(OH)D2 SERPL-MCNC: 36.1 NG/ML (ref 30–100)
BASOPHILS # BLD AUTO: 0.1 X10E3/UL (ref 0–0.2)
BASOPHILS NFR BLD AUTO: 1 %
CHOLEST SERPL-MCNC: 188 MG/DL (ref 100–199)
EOSINOPHIL # BLD AUTO: 0.2 X10E3/UL (ref 0–0.4)
EOSINOPHIL NFR BLD AUTO: 3 %
ERYTHROCYTE [DISTWIDTH] IN BLOOD BY AUTOMATED COUNT: 12.9 % (ref 11.7–15.4)
HBA1C MFR BLD: 5.5 % (ref 4.8–5.6)
HCT VFR BLD AUTO: 37.7 % (ref 34–46.6)
HCV AB S/CO SERPL IA: <0.1 S/CO RATIO (ref 0–0.9)
HDLC SERPL-MCNC: 70 MG/DL
HGB BLD-MCNC: 13 G/DL (ref 11.1–15.9)
IMM GRANULOCYTES # BLD AUTO: 0 X10E3/UL (ref 0–0.1)
IMM GRANULOCYTES NFR BLD AUTO: 0 %
LDLC SERPL CALC-MCNC: 104 MG/DL (ref 0–99)
LYMPHOCYTES # BLD AUTO: 1.5 X10E3/UL (ref 0.7–3.1)
LYMPHOCYTES NFR BLD AUTO: 28 %
MCH RBC QN AUTO: 30.9 PG (ref 26.6–33)
MCHC RBC AUTO-ENTMCNC: 34.5 G/DL (ref 31.5–35.7)
MCV RBC AUTO: 90 FL (ref 79–97)
MONOCYTES # BLD AUTO: 0.3 X10E3/UL (ref 0.1–0.9)
MONOCYTES NFR BLD AUTO: 5 %
NEUTROPHILS # BLD AUTO: 3.3 X10E3/UL (ref 1.4–7)
NEUTROPHILS NFR BLD AUTO: 63 %
PLATELET # BLD AUTO: 282 X10E3/UL (ref 150–450)
PROLACTIN SERPL-MCNC: 11.5 NG/ML (ref 4.8–23.3)
RBC # BLD AUTO: 4.21 X10E6/UL (ref 3.77–5.28)
TRIGL SERPL-MCNC: 75 MG/DL (ref 0–149)
TSH SERPL DL<=0.005 MIU/L-ACNC: 2.17 UIU/ML (ref 0.45–4.5)
VLDLC SERPL CALC-MCNC: 14 MG/DL (ref 5–40)
WBC # BLD AUTO: 5.4 X10E3/UL (ref 3.4–10.8)

## 2022-12-08 DIAGNOSIS — N64.52 NIPPLE DISCHARGE: Primary | ICD-10-CM

## 2023-01-24 RX ORDER — LORAZEPAM 1 MG/1
1 TABLET ORAL EVERY 6 HOURS PRN
Qty: 10 TABLET | Refills: 0 | Status: SHIPPED | OUTPATIENT
Start: 2023-01-24 | End: 2023-02-23

## 2023-01-25 ENCOUNTER — HOSPITAL ENCOUNTER (OUTPATIENT)
Dept: RADIOLOGY | Age: 48
Discharge: HOME | End: 2023-01-25
Attending: OBSTETRICS & GYNECOLOGY
Payer: COMMERCIAL

## 2023-01-25 ENCOUNTER — HOSPITAL ENCOUNTER (OUTPATIENT)
Dept: RADIOLOGY | Age: 48
Discharge: HOME | End: 2023-01-25
Attending: RADIOLOGY
Payer: COMMERCIAL

## 2023-01-25 DIAGNOSIS — N64.52 NIPPLE DISCHARGE: ICD-10-CM

## 2023-01-25 PROCEDURE — 76642 ULTRASOUND BREAST LIMITED: CPT | Mod: RT

## 2023-01-25 PROCEDURE — G0279 TOMOSYNTHESIS, MAMMO: HCPCS

## 2024-01-25 DIAGNOSIS — N63.0 MASS OF BREAST, UNSPECIFIED LATERALITY: Primary | ICD-10-CM

## (undated) DEVICE — ***USE 121401***PACK DELIVERY C-SECTION CUSTOM

## (undated) DEVICE — ***USE 138714*** SUTURE VICRYL 1 J341H CT-1

## (undated) DEVICE — Device

## (undated) DEVICE — SPONGE LAP DISPOSABLE 18X18

## (undated) DEVICE — ***USE 127961*** SUTURE VICRYL 0 J740D CR CT-1 18IN VIOLET

## (undated) DEVICE — SUTURE MONOCRYL 4-0 Y426H PS-2 27IN

## (undated) DEVICE — SPONGE CURITY GAUZE 4

## (undated) DEVICE — DRESSING TELFA 3X8

## (undated) DEVICE — PENCIL ESU HANDSWITCHING W/HOL

## (undated) DEVICE — BLADE SCALPEL #10

## (undated) DEVICE — SPONGE SURGIFOAM ABSORBABLE GELATIN 100 8.5CM X 12CM X 10MM

## (undated) DEVICE — SYRINGE BULB 60CC

## (undated) DEVICE — MANIFOLD FOUR PORT NEPTUNE

## (undated) DEVICE — APPLICATOR CHLORAPREP 26ML ORANGE TINT

## (undated) DEVICE — SUTURE VICRYL 3-0 RAPIDE 36"

## (undated) DEVICE — DRESSING ABD STER LGE 8X10

## (undated) DEVICE — ***USE 56952*** SUTURE VICRYL 1 J371H CTX 36IN VIOLET

## (undated) DEVICE — STERILE BLOOD COLLECTION TUBES

## (undated) DEVICE — SEALANT SURGICAL FLOSEAL 5ML STERILE PREP RECOTHERM

## (undated) DEVICE — PAD GROUND ELECTROSURGICAL W/CORD

## (undated) DEVICE — CONTAINER SPECIMEN STERILE 5OZ